# Patient Record
Sex: FEMALE | Race: WHITE | NOT HISPANIC OR LATINO | Employment: OTHER | URBAN - METROPOLITAN AREA
[De-identification: names, ages, dates, MRNs, and addresses within clinical notes are randomized per-mention and may not be internally consistent; named-entity substitution may affect disease eponyms.]

---

## 2023-10-19 PROBLEM — S52.121A RIGHT RADIAL HEAD FRACTURE: Status: ACTIVE | Noted: 2023-10-19

## 2023-10-19 PROBLEM — J02.9 SORE THROAT: Status: ACTIVE | Noted: 2023-10-19

## 2023-10-19 PROBLEM — R10.2 PELVIC PAIN IN FEMALE: Status: ACTIVE | Noted: 2023-10-19

## 2023-10-19 PROBLEM — R14.0 ABDOMINAL BLOATING: Status: ACTIVE | Noted: 2023-10-19

## 2023-10-19 PROBLEM — K76.0 FATTY LIVER: Status: ACTIVE | Noted: 2023-10-19

## 2023-10-19 PROBLEM — S50.00XA ELBOW CONTUSION: Status: ACTIVE | Noted: 2023-10-19

## 2023-10-19 PROBLEM — M17.11 ARTHRITIS OF KNEE, RIGHT: Status: ACTIVE | Noted: 2023-10-19

## 2023-10-19 PROBLEM — L82.1 OTHER SEBORRHEIC KERATOSIS: Status: ACTIVE | Noted: 2020-04-06

## 2023-10-19 PROBLEM — M75.101 ROTATOR CUFF TEAR, RIGHT: Status: ACTIVE | Noted: 2023-10-19

## 2023-10-19 PROBLEM — D18.01 HEMANGIOMA OF SKIN AND SUBCUTANEOUS TISSUE: Status: ACTIVE | Noted: 2020-04-06

## 2023-10-19 PROBLEM — R49.0 MUSCLE TENSION DYSPHONIA: Status: ACTIVE | Noted: 2023-10-19

## 2023-10-19 PROBLEM — K57.32 DIVERTICULITIS, COLON: Status: ACTIVE | Noted: 2023-10-19

## 2023-10-19 PROBLEM — S52.123D CLOSED DISPLACED FRACTURE OF HEAD OF RADIUS WITH ROUTINE HEALING: Status: ACTIVE | Noted: 2023-10-19

## 2023-10-19 PROBLEM — R51.9 PERSISTENT HEADACHES: Status: ACTIVE | Noted: 2023-10-19

## 2023-10-19 PROBLEM — M76.71 PERONEAL TENDINITIS OF BOTH LOWER LEGS: Status: ACTIVE | Noted: 2023-10-19

## 2023-10-19 PROBLEM — E66.9 CLASS 1 OBESITY WITHOUT SERIOUS COMORBIDITY WITH BODY MASS INDEX (BMI) OF 30.0 TO 30.9 IN ADULT: Status: ACTIVE | Noted: 2023-10-19

## 2023-10-19 PROBLEM — R93.89 ABNORMAL CHEST X-RAY: Status: ACTIVE | Noted: 2023-10-19

## 2023-10-19 PROBLEM — R06.83 SNORING: Status: ACTIVE | Noted: 2023-10-19

## 2023-10-19 PROBLEM — K90.49 FOOD INTOLERANCE: Status: ACTIVE | Noted: 2023-10-19

## 2023-10-19 PROBLEM — G56.21 CUBITAL TUNNEL SYNDROME ON RIGHT: Status: ACTIVE | Noted: 2023-10-19

## 2023-10-19 PROBLEM — N20.0 MULTIPLE KIDNEY STONES: Status: ACTIVE | Noted: 2023-10-19

## 2023-10-19 PROBLEM — J30.2 SEASONAL ALLERGIC RHINITIS: Status: ACTIVE | Noted: 2023-10-19

## 2023-10-19 PROBLEM — L72.9 CYST OF SKIN: Status: ACTIVE | Noted: 2023-10-19

## 2023-10-19 PROBLEM — R16.2 HEPATOSPLENOMEGALY: Status: ACTIVE | Noted: 2023-10-19

## 2023-10-19 PROBLEM — N83.8 CALCIFICATION OF OVARY: Status: ACTIVE | Noted: 2023-10-19

## 2023-10-19 PROBLEM — M79.603 CHRONIC ARM PAIN: Status: ACTIVE | Noted: 2023-10-19

## 2023-10-19 PROBLEM — E03.9 HYPOTHYROIDISM: Status: ACTIVE | Noted: 2023-10-19

## 2023-10-19 PROBLEM — G47.33 MILD OBSTRUCTIVE SLEEP APNEA: Status: ACTIVE | Noted: 2023-10-19

## 2023-10-19 PROBLEM — M22.40 CHONDROMALACIA OF PATELLOFEMORAL JOINT: Status: ACTIVE | Noted: 2023-10-19

## 2023-10-19 PROBLEM — R05.9 COUGH IN ADULT: Status: ACTIVE | Noted: 2023-10-19

## 2023-10-19 PROBLEM — J30.9 ALLERGIC RHINITIS: Status: ACTIVE | Noted: 2023-10-19

## 2023-10-19 PROBLEM — K21.9 REFLUX LARYNGITIS: Status: ACTIVE | Noted: 2023-10-19

## 2023-10-19 PROBLEM — M54.50 CHRONIC LOW BACK PAIN: Status: ACTIVE | Noted: 2023-10-19

## 2023-10-19 PROBLEM — T78.1XXA REACTION TO FOOD: Status: ACTIVE | Noted: 2023-10-19

## 2023-10-19 PROBLEM — L65.9 ALOPECIA: Status: ACTIVE | Noted: 2023-10-19

## 2023-10-19 PROBLEM — K21.9 GERD (GASTROESOPHAGEAL REFLUX DISEASE): Status: ACTIVE | Noted: 2023-10-19

## 2023-10-19 PROBLEM — G89.29 CHRONIC PAIN: Status: ACTIVE | Noted: 2023-10-19

## 2023-10-19 PROBLEM — G89.29 CHRONIC LOW BACK PAIN: Status: ACTIVE | Noted: 2023-10-19

## 2023-10-19 PROBLEM — L57.9 SKIN CHANGES DUE TO CHRONIC EXPOSURE TO NONIONIZING RADIATION, UNSPECIFIED: Status: ACTIVE | Noted: 2020-04-06

## 2023-10-19 PROBLEM — R10.9 ABDOMINAL PAIN: Status: ACTIVE | Noted: 2023-10-19

## 2023-10-19 PROBLEM — L73.8 OTHER SPECIFIED FOLLICULAR DISORDERS: Status: ACTIVE | Noted: 2020-04-06

## 2023-10-19 PROBLEM — J06.9 URI WITH COUGH AND CONGESTION: Status: ACTIVE | Noted: 2023-10-19

## 2023-10-19 PROBLEM — E78.5 HYPERLIPIDEMIA: Status: ACTIVE | Noted: 2023-10-19

## 2023-10-19 PROBLEM — E80.6 HYPERBILIRUBINEMIA: Status: ACTIVE | Noted: 2023-10-19

## 2023-10-19 PROBLEM — B00.9 HERPESVIRAL INFECTION, UNSPECIFIED: Status: ACTIVE | Noted: 2020-04-06

## 2023-10-19 PROBLEM — R19.7 DIARRHEA: Status: ACTIVE | Noted: 2023-10-19

## 2023-10-19 PROBLEM — R73.9 HYPERGLYCEMIA: Status: ACTIVE | Noted: 2023-10-19

## 2023-10-19 PROBLEM — J04.0 REFLUX LARYNGITIS: Status: ACTIVE | Noted: 2023-10-19

## 2023-10-19 PROBLEM — R19.00 MASS OF SOFT TISSUE OF ABDOMEN: Status: ACTIVE | Noted: 2023-10-19

## 2023-10-19 PROBLEM — E66.811 CLASS 1 OBESITY WITHOUT SERIOUS COMORBIDITY WITH BODY MASS INDEX (BMI) OF 30.0 TO 30.9 IN ADULT: Status: ACTIVE | Noted: 2023-10-19

## 2023-10-19 PROBLEM — G89.29 CHRONIC ARM PAIN: Status: ACTIVE | Noted: 2023-10-19
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
## 2023-10-19 PROBLEM — M76.72 PERONEAL TENDINITIS OF BOTH LOWER LEGS: Status: ACTIVE | Noted: 2023-10-19

## 2023-10-19 PROBLEM — L81.4 OTHER MELANIN HYPERPIGMENTATION: Status: ACTIVE | Noted: 2020-04-06

## 2023-10-19 PROBLEM — K52.9 ENTERITIS: Status: ACTIVE | Noted: 2023-10-19

## 2023-10-19 RX ORDER — OMEPRAZOLE 20 MG/1
CAPSULE, DELAYED RELEASE ORAL
COMMUNITY
End: 2023-10-20 | Stop reason: ALTCHOICE

## 2023-10-19 RX ORDER — GLUCOSAM/CHONDRO/HERB 149/HYAL 750-100 MG
TABLET ORAL
COMMUNITY

## 2023-10-19 RX ORDER — HYDROCORTISONE 25 MG/G
OINTMENT TOPICAL
COMMUNITY
Start: 2019-12-31 | End: 2023-10-20 | Stop reason: ALTCHOICE

## 2023-10-19 RX ORDER — OMEPRAZOLE 40 MG/1
CAPSULE, DELAYED RELEASE ORAL
COMMUNITY
Start: 2022-07-07 | End: 2023-10-20 | Stop reason: ALTCHOICE

## 2023-10-19 RX ORDER — GABAPENTIN 300 MG/1
CAPSULE ORAL
COMMUNITY
End: 2023-10-20 | Stop reason: ALTCHOICE

## 2023-10-19 RX ORDER — DIPHENHYDRAMINE HCL 25 MG
TABLET ORAL
COMMUNITY

## 2023-10-19 RX ORDER — LEVOTHYROXINE SODIUM 75 UG/1
1 TABLET ORAL DAILY
COMMUNITY
Start: 2014-10-30 | End: 2023-10-20 | Stop reason: ALTCHOICE

## 2023-10-19 RX ORDER — CHOLECALCIFEROL (VITAMIN D3) 50 MCG
1 TABLET ORAL DAILY
COMMUNITY

## 2023-10-19 RX ORDER — MECLIZINE HYDROCHLORIDE 25 MG/1
TABLET ORAL
COMMUNITY
End: 2023-10-20

## 2023-10-19 RX ORDER — BIOTIN 1 MG
TABLET ORAL
COMMUNITY

## 2023-10-19 RX ORDER — EPINEPHRINE 0.3 MG/.3ML
INJECTION, SOLUTION INTRAMUSCULAR
COMMUNITY
Start: 2021-12-23

## 2023-10-19 RX ORDER — BENZONATATE 200 MG/1
CAPSULE ORAL
COMMUNITY
End: 2023-10-20 | Stop reason: ALTCHOICE

## 2023-10-20 ENCOUNTER — OFFICE VISIT (OUTPATIENT)
Dept: PRIMARY CARE | Facility: CLINIC | Age: 68
End: 2023-10-20
Payer: MEDICARE

## 2023-10-20 VITALS
HEIGHT: 58 IN | WEIGHT: 167 LBS | BODY MASS INDEX: 35.05 KG/M2 | TEMPERATURE: 97.3 F | DIASTOLIC BLOOD PRESSURE: 78 MMHG | HEART RATE: 86 BPM | SYSTOLIC BLOOD PRESSURE: 119 MMHG

## 2023-10-20 DIAGNOSIS — E03.8 OTHER SPECIFIED HYPOTHYROIDISM: ICD-10-CM

## 2023-10-20 DIAGNOSIS — Z13.820 ENCOUNTER FOR OSTEOPOROSIS SCREENING IN ASYMPTOMATIC POSTMENOPAUSAL PATIENT: ICD-10-CM

## 2023-10-20 DIAGNOSIS — Z71.85 VACCINE COUNSELING: ICD-10-CM

## 2023-10-20 DIAGNOSIS — Z13.820 OSTEOPOROSIS SCREENING: ICD-10-CM

## 2023-10-20 DIAGNOSIS — Z12.11 SCREEN FOR COLON CANCER: ICD-10-CM

## 2023-10-20 DIAGNOSIS — Z78.0 ENCOUNTER FOR OSTEOPOROSIS SCREENING IN ASYMPTOMATIC POSTMENOPAUSAL PATIENT: ICD-10-CM

## 2023-10-20 DIAGNOSIS — Z00.00 MEDICARE WELCOME EXAM: ICD-10-CM

## 2023-10-20 DIAGNOSIS — Z13.6 SCREENING FOR HEART DISEASE: ICD-10-CM

## 2023-10-20 DIAGNOSIS — Z13.31 DEPRESSION SCREENING: ICD-10-CM

## 2023-10-20 DIAGNOSIS — I83.93 VARICOSE VEINS OF BOTH LOWER EXTREMITIES, UNSPECIFIED WHETHER COMPLICATED: ICD-10-CM

## 2023-10-20 DIAGNOSIS — R79.9 ABNORMAL FINDING OF BLOOD CHEMISTRY, UNSPECIFIED: ICD-10-CM

## 2023-10-20 DIAGNOSIS — Z23 FLU VACCINE NEED: ICD-10-CM

## 2023-10-20 DIAGNOSIS — Z12.31 ENCOUNTER FOR SCREENING MAMMOGRAM FOR MALIGNANT NEOPLASM OF BREAST: ICD-10-CM

## 2023-10-20 DIAGNOSIS — R10.13 EPIGASTRIC PAIN: ICD-10-CM

## 2023-10-20 DIAGNOSIS — Z78.9 NEVER SMOKED TOBACCO: ICD-10-CM

## 2023-10-20 DIAGNOSIS — E78.49 OTHER HYPERLIPIDEMIA: ICD-10-CM

## 2023-10-20 DIAGNOSIS — Z13.89 SCREENING FOR OBESITY: Primary | ICD-10-CM

## 2023-10-20 DIAGNOSIS — L65.9 HAIR LOSS: ICD-10-CM

## 2023-10-20 PROCEDURE — G0438 PPPS, INITIAL VISIT: HCPCS | Performed by: INTERNAL MEDICINE

## 2023-10-20 PROCEDURE — 1160F RVW MEDS BY RX/DR IN RCRD: CPT | Performed by: INTERNAL MEDICINE

## 2023-10-20 PROCEDURE — G0008 ADMIN INFLUENZA VIRUS VAC: HCPCS | Performed by: INTERNAL MEDICINE

## 2023-10-20 PROCEDURE — 90662 IIV NO PRSV INCREASED AG IM: CPT | Performed by: INTERNAL MEDICINE

## 2023-10-20 PROCEDURE — 1036F TOBACCO NON-USER: CPT | Performed by: INTERNAL MEDICINE

## 2023-10-20 PROCEDURE — 1125F AMNT PAIN NOTED PAIN PRSNT: CPT | Performed by: INTERNAL MEDICINE

## 2023-10-20 PROCEDURE — 1159F MED LIST DOCD IN RCRD: CPT | Performed by: INTERNAL MEDICINE

## 2023-10-20 PROCEDURE — 3008F BODY MASS INDEX DOCD: CPT | Performed by: INTERNAL MEDICINE

## 2023-10-20 RX ORDER — ALBUTEROL SULFATE 90 UG/1
2 AEROSOL, METERED RESPIRATORY (INHALATION) EVERY 4 HOURS PRN
COMMUNITY
Start: 2019-02-08 | End: 2023-10-20 | Stop reason: ALTCHOICE

## 2023-10-20 RX ORDER — FLUCONAZOLE 100 MG/1
TABLET ORAL
COMMUNITY
Start: 2023-09-21 | End: 2023-10-20 | Stop reason: ALTCHOICE

## 2023-10-20 RX ORDER — AMOXICILLIN 500 MG/1
CAPSULE ORAL
COMMUNITY
Start: 2023-08-29 | End: 2023-10-20 | Stop reason: ALTCHOICE

## 2023-10-20 RX ORDER — VALACYCLOVIR HYDROCHLORIDE 500 MG/1
TABLET, FILM COATED ORAL
COMMUNITY
Start: 2023-08-29 | End: 2023-10-20 | Stop reason: ALTCHOICE

## 2023-10-20 ASSESSMENT — PATIENT HEALTH QUESTIONNAIRE - PHQ9
SUM OF ALL RESPONSES TO PHQ QUESTIONS 1-9: 6
3. TROUBLE FALLING OR STAYING ASLEEP: SEVERAL DAYS
2. FEELING DOWN, DEPRESSED OR HOPELESS: NEARLY EVERY DAY
7. TROUBLE CONCENTRATING ON THINGS, SUCH AS READING THE NEWSPAPER OR WATCHING TELEVISION: NOT AT ALL
6. FEELING BAD ABOUT YOURSELF - OR THAT YOU ARE A FAILURE OR HAVE LET YOURSELF OR YOUR FAMILY DOWN: NOT AT ALL
10. IF YOU CHECKED OFF ANY PROBLEMS, HOW DIFFICULT HAVE THESE PROBLEMS MADE IT FOR YOU TO DO YOUR WORK, TAKE CARE OF THINGS AT HOME, OR GET ALONG WITH OTHER PEOPLE: SOMEWHAT DIFFICULT
1. LITTLE INTEREST OR PLEASURE IN DOING THINGS: NOT AT ALL
SUM OF ALL RESPONSES TO PHQ9 QUESTIONS 1 & 2: 3
9. THOUGHTS THAT YOU WOULD BE BETTER OFF DEAD, OR OF HURTING YOURSELF: NOT AT ALL
8. MOVING OR SPEAKING SO SLOWLY THAT OTHER PEOPLE COULD HAVE NOTICED. OR THE OPPOSITE, BEING SO FIGETY OR RESTLESS THAT YOU HAVE BEEN MOVING AROUND A LOT MORE THAN USUAL: NOT AT ALL
4. FEELING TIRED OR HAVING LITTLE ENERGY: SEVERAL DAYS
5. POOR APPETITE OR OVEREATING: SEVERAL DAYS

## 2023-10-20 ASSESSMENT — ENCOUNTER SYMPTOMS
LOSS OF SENSATION IN FEET: 0
DEPRESSION: 0
OCCASIONAL FEELINGS OF UNSTEADINESS: 0

## 2023-10-20 NOTE — PROGRESS NOTES
"Subjective   Reason for Visit: Cristy Morgna is an 67 y.o. female here for a Medicare Wellness visit.          Reviewed all medications by prescribing practitioner or clinical pharmacist (such as prescriptions, OTCs, herbal therapies and supplements) and documented in the medical record.    HPI Pt is a pleasant 67 y.o. F who was seen and evaluated during the Initial Medicare Visit. Pt was not seen by PCP for the couple years and was off the Levothyroxine at least a year. Pt also states that she had a \"bad food poisoning\" during her international trip to [place]. Pt was at the hospital at that time and was told that she has \"enlarged liver\". Pt states that she has the chronic epigastric tenderness, triggered by the physical activity. Pt also reports weigh skye and hair loss issue. During the clinical encounter pt denies fever, chills, no SOB, no chest pain/tightness, no N/V/D reported, no change of urination reported. Pt denies any other health concerns during this visit. Pt would like to have all screening appropriate for her age group.  The list of the medications reviewed  Sohx: reviewed  PMHx and Fhx: reviewed      Patient Care Team:  Vidhya Leavitt DO as PCP - General     Review of Systems  Constitutional: no fever, no chills  Eyes: no eyesight problems, no eye redness, no eye pain, no blurred vision  ENT: no ear pain or sore throat, no nasal discharge or congestion, no sinus pressure, no hearing loss  Cardiovascular: no chest pain or tightness, no SOB, the heart rate was not fast or slow  Respiratory: no cough, no dyspnea with exertion or with rest, no wheezing  Gastrointestinal: no constipation or diarrhea, no heartburn, no nausea or vomiting, but as mentioned at [facility]  Genitourinary: no urine frequency, no dysuria, no urinary urgency, no urinary incontinence or retention  Musculoskeletal: no back pain, no arthralgia, no joint swelling or stiffness, no muscle weakness  Integumentary: no skin lesions or rash, " "but as mentioned  Neurological: no headaches, no dizziness or fainting, no limb weakness  Psychiatric: no mood changes, no anxiety or depression, no suicidal thoughts  Endocrine: unable to loose weight, no temperature intolerance, no change of appetite  Hematologic/Lymphatic: no easy bruising or bleeding    Objective   Vitals:  /78 (BP Location: Left arm)   Pulse 86   Temp 36.3 °C (97.3 °F)   Ht 1.473 m (4' 10\")   Wt 75.8 kg (167 lb)   BMI 34.90 kg/m²       Physical Exam    Constitutional: well hydrated, well nourished, no acute distress. Vital signs reviewed  Head and face: normocephalic, atraumatic  Eyes: no erythema, edema or visible abnormalities of conjunctiva or lids. Pupils are equal, round and reactive to light. Extraocular movement normal  ENT: external ears without deformities, external ear canals without redness, swelling or tenderness. TMs normal color, normal landmarks, no fluid, non-retracted  Neck: full ROM, no adenopathy, neck was supple, thyroid gland not enlarged, no palpable nodules  Pulmonary: normal respiratory rate and effort. Lungs are clear to auscultation, no rales,no rhonchi or wheezing  Cardiovascular: regular rate and rhythm, normal S1 and S2, no murmur, no gallop, posterior tib. pulse normal 2+ bilaterally, no lower extremity edema  Abdomen: soft,  dis comfortable  on palpation at the epigastric region, not distended, normal BS in all 4 quadrants, no CVA tenderness  Musculoskeletal: no joint swelling, normal movements of all 4 extremities. Gait and station: normal, Digits and nails: normal without clubbing  Skin: normal color, no rash  Neurologic: Cranial nerves: CN II: visual acuity intact. Source: acuity reported by patient. Pupils reactive to light with normal accommodation. Right and left pupil normal CN III, IV and VI: the EO movements were intact. CN VIII: no hearing loss. Deep tendon reflexes: were 2+ and symmetric: R and L patella.  Sensory exam: normal light to " touch  Psychiatric: Mood and affect: normal, Alert and Oriented x 3  Lymphatic: no cervical lymphadenopathy    Assessment/Plan   Initial medicare wellness visit:  Hx and PE as mentioned  Obesity screening: BMI of 34, pt will be beneficial from the lifestyle modifications  MDD screening: declined by the pt  Breast CA screening: the screening mammogram was ordered  Osteoporosis screening: will order DEXA scan  Pt is UTD with the screening colonoscopy and cervical CA screening  Pt was counseled about vaccine and flu vaccine was provided today  Pt will need AWV    Hypothyroidism: will check TSH/T4  HLD: will check lipid panel  Will order DM screening with HGBA1C  Epigastric discomfort: will order US of the liver  Hair loss issue: will provide a referral for the dermatology team evaluation as pre the pts request  Plan was d/c with the pt in details, verbalized understanding, agreed  Please follow up with PCP as planned or sooner if needed             Problem List Items Addressed This Visit    None

## 2023-10-24 ENCOUNTER — LAB (OUTPATIENT)
Dept: LAB | Facility: LAB | Age: 68
End: 2023-10-24
Payer: MEDICARE

## 2023-10-24 DIAGNOSIS — Z00.00 MEDICARE WELCOME EXAM: ICD-10-CM

## 2023-10-24 DIAGNOSIS — R79.9 ABNORMAL FINDING OF BLOOD CHEMISTRY, UNSPECIFIED: ICD-10-CM

## 2023-10-24 DIAGNOSIS — E78.49 OTHER HYPERLIPIDEMIA: ICD-10-CM

## 2023-10-24 DIAGNOSIS — E03.8 OTHER SPECIFIED HYPOTHYROIDISM: ICD-10-CM

## 2023-10-24 LAB
ALBUMIN SERPL BCP-MCNC: 4.2 G/DL (ref 3.4–5)
ALP SERPL-CCNC: 53 U/L (ref 33–136)
ALT SERPL W P-5'-P-CCNC: 34 U/L (ref 7–45)
ANION GAP SERPL CALC-SCNC: 12 MMOL/L (ref 10–20)
AST SERPL W P-5'-P-CCNC: 31 U/L (ref 9–39)
BASOPHILS # BLD AUTO: 0.05 X10*3/UL (ref 0–0.1)
BASOPHILS NFR BLD AUTO: 1 %
BILIRUB SERPL-MCNC: 1.2 MG/DL (ref 0–1.2)
BUN SERPL-MCNC: 9 MG/DL (ref 6–23)
CALCIUM SERPL-MCNC: 9.1 MG/DL (ref 8.6–10.3)
CHLORIDE SERPL-SCNC: 105 MMOL/L (ref 98–107)
CHOLEST SERPL-MCNC: 205 MG/DL (ref 0–199)
CHOLESTEROL/HDL RATIO: 4.7
CO2 SERPL-SCNC: 28 MMOL/L (ref 21–32)
CREAT SERPL-MCNC: 0.77 MG/DL (ref 0.5–1.05)
EOSINOPHIL # BLD AUTO: 0.14 X10*3/UL (ref 0–0.7)
EOSINOPHIL NFR BLD AUTO: 2.7 %
ERYTHROCYTE [DISTWIDTH] IN BLOOD BY AUTOMATED COUNT: 12.5 % (ref 11.5–14.5)
EST. AVERAGE GLUCOSE BLD GHB EST-MCNC: 117 MG/DL
GFR SERPL CREATININE-BSD FRML MDRD: 86 ML/MIN/1.73M*2
GLUCOSE SERPL-MCNC: 105 MG/DL (ref 74–99)
HBA1C MFR BLD: 5.7 %
HCT VFR BLD AUTO: 44.2 % (ref 36–46)
HDLC SERPL-MCNC: 44 MG/DL
HGB BLD-MCNC: 14.9 G/DL (ref 12–16)
IMM GRANULOCYTES # BLD AUTO: 0.05 X10*3/UL (ref 0–0.7)
IMM GRANULOCYTES NFR BLD AUTO: 1 % (ref 0–0.9)
LDLC SERPL CALC-MCNC: 112 MG/DL
LYMPHOCYTES # BLD AUTO: 2.33 X10*3/UL (ref 1.2–4.8)
LYMPHOCYTES NFR BLD AUTO: 44.6 %
MCH RBC QN AUTO: 32 PG (ref 26–34)
MCHC RBC AUTO-ENTMCNC: 33.7 G/DL (ref 32–36)
MCV RBC AUTO: 95 FL (ref 80–100)
MONOCYTES # BLD AUTO: 0.44 X10*3/UL (ref 0.1–1)
MONOCYTES NFR BLD AUTO: 8.4 %
NEUTROPHILS # BLD AUTO: 2.22 X10*3/UL (ref 1.2–7.7)
NEUTROPHILS NFR BLD AUTO: 42.3 %
NON HDL CHOLESTEROL: 161 MG/DL (ref 0–149)
NRBC BLD-RTO: 0 /100 WBCS (ref 0–0)
PLATELET # BLD AUTO: 144 X10*3/UL (ref 150–450)
PMV BLD AUTO: 10.9 FL (ref 7.5–11.5)
POTASSIUM SERPL-SCNC: 3.7 MMOL/L (ref 3.5–5.3)
PROT SERPL-MCNC: 6.9 G/DL (ref 6.4–8.2)
RBC # BLD AUTO: 4.66 X10*6/UL (ref 4–5.2)
SODIUM SERPL-SCNC: 141 MMOL/L (ref 136–145)
T4 FREE SERPL-MCNC: 0.52 NG/DL (ref 0.61–1.12)
TRIGL SERPL-MCNC: 247 MG/DL (ref 0–149)
TSH SERPL-ACNC: 10.37 MIU/L (ref 0.44–3.98)
VLDL: 49 MG/DL (ref 0–40)
WBC # BLD AUTO: 5.2 X10*3/UL (ref 4.4–11.3)

## 2023-10-24 PROCEDURE — 85025 COMPLETE CBC W/AUTO DIFF WBC: CPT

## 2023-10-24 PROCEDURE — 80053 COMPREHEN METABOLIC PANEL: CPT

## 2023-10-24 PROCEDURE — 84443 ASSAY THYROID STIM HORMONE: CPT

## 2023-10-24 PROCEDURE — 80061 LIPID PANEL: CPT

## 2023-10-24 PROCEDURE — 83036 HEMOGLOBIN GLYCOSYLATED A1C: CPT

## 2023-10-24 PROCEDURE — 36415 COLL VENOUS BLD VENIPUNCTURE: CPT

## 2023-10-24 PROCEDURE — 84439 ASSAY OF FREE THYROXINE: CPT

## 2023-10-26 ENCOUNTER — TELEPHONE (OUTPATIENT)
Dept: PRIMARY CARE | Facility: CLINIC | Age: 68
End: 2023-10-26
Payer: MEDICARE

## 2023-10-27 ENCOUNTER — APPOINTMENT (OUTPATIENT)
Dept: PRIMARY CARE | Facility: CLINIC | Age: 68
End: 2023-10-27
Payer: MEDICARE

## 2023-10-28 ENCOUNTER — ANCILLARY PROCEDURE (OUTPATIENT)
Dept: RADIOLOGY | Facility: CLINIC | Age: 68
End: 2023-10-28
Payer: MEDICARE

## 2023-10-28 DIAGNOSIS — Z00.00 MEDICARE WELCOME EXAM: ICD-10-CM

## 2023-10-28 DIAGNOSIS — Z13.820 OSTEOPOROSIS SCREENING: ICD-10-CM

## 2023-10-28 DIAGNOSIS — Z13.820 ENCOUNTER FOR OSTEOPOROSIS SCREENING IN ASYMPTOMATIC POSTMENOPAUSAL PATIENT: ICD-10-CM

## 2023-10-28 DIAGNOSIS — R10.13 EPIGASTRIC PAIN: ICD-10-CM

## 2023-10-28 DIAGNOSIS — Z78.0 ENCOUNTER FOR OSTEOPOROSIS SCREENING IN ASYMPTOMATIC POSTMENOPAUSAL PATIENT: ICD-10-CM

## 2023-10-28 PROCEDURE — 76705 ECHO EXAM OF ABDOMEN: CPT

## 2023-10-28 PROCEDURE — 76705 ECHO EXAM OF ABDOMEN: CPT | Performed by: RADIOLOGY

## 2023-10-30 ENCOUNTER — OFFICE VISIT (OUTPATIENT)
Dept: PRIMARY CARE | Facility: CLINIC | Age: 68
End: 2023-10-30
Payer: MEDICARE

## 2023-10-30 ENCOUNTER — APPOINTMENT (OUTPATIENT)
Dept: RADIOLOGY | Facility: CLINIC | Age: 68
End: 2023-10-30
Payer: MEDICARE

## 2023-10-30 VITALS
BODY MASS INDEX: 34.63 KG/M2 | HEART RATE: 79 BPM | HEIGHT: 58 IN | DIASTOLIC BLOOD PRESSURE: 74 MMHG | WEIGHT: 165 LBS | SYSTOLIC BLOOD PRESSURE: 119 MMHG | TEMPERATURE: 97.2 F

## 2023-10-30 DIAGNOSIS — R73.03 PRE-DIABETES: ICD-10-CM

## 2023-10-30 DIAGNOSIS — Z71.2 ENCOUNTER TO DISCUSS TEST RESULTS: ICD-10-CM

## 2023-10-30 DIAGNOSIS — Z71.89 CARE PLAN DISCUSSED WITH PATIENT: Primary | ICD-10-CM

## 2023-10-30 DIAGNOSIS — E78.49 OTHER HYPERLIPIDEMIA: ICD-10-CM

## 2023-10-30 DIAGNOSIS — E03.8 OTHER SPECIFIED HYPOTHYROIDISM: ICD-10-CM

## 2023-10-30 DIAGNOSIS — K76.0 FATTY LIVER: ICD-10-CM

## 2023-10-30 DIAGNOSIS — D69.6 DECREASED PLATELET COUNT (CMS-HCC): ICD-10-CM

## 2023-10-30 PROCEDURE — 1036F TOBACCO NON-USER: CPT | Performed by: INTERNAL MEDICINE

## 2023-10-30 PROCEDURE — 1160F RVW MEDS BY RX/DR IN RCRD: CPT | Performed by: INTERNAL MEDICINE

## 2023-10-30 PROCEDURE — 3008F BODY MASS INDEX DOCD: CPT | Performed by: INTERNAL MEDICINE

## 2023-10-30 PROCEDURE — 1159F MED LIST DOCD IN RCRD: CPT | Performed by: INTERNAL MEDICINE

## 2023-10-30 PROCEDURE — 99214 OFFICE O/P EST MOD 30 MIN: CPT | Performed by: INTERNAL MEDICINE

## 2023-10-30 PROCEDURE — 1125F AMNT PAIN NOTED PAIN PRSNT: CPT | Performed by: INTERNAL MEDICINE

## 2023-10-30 RX ORDER — LEVOTHYROXINE SODIUM 25 UG/1
25 TABLET ORAL
Qty: 30 TABLET | Refills: 11 | Status: SHIPPED | OUTPATIENT
Start: 2023-10-30 | End: 2024-01-15

## 2023-10-30 RX ORDER — SIMVASTATIN 10 MG/1
10 TABLET, FILM COATED ORAL NIGHTLY
Qty: 30 TABLET | Refills: 5 | Status: SHIPPED | OUTPATIENT
Start: 2023-10-30 | End: 2024-01-15

## 2023-10-30 ASSESSMENT — PATIENT HEALTH QUESTIONNAIRE - PHQ9
1. LITTLE INTEREST OR PLEASURE IN DOING THINGS: NOT AT ALL
SUM OF ALL RESPONSES TO PHQ9 QUESTIONS 1 AND 2: 0
2. FEELING DOWN, DEPRESSED OR HOPELESS: NOT AT ALL

## 2023-10-30 NOTE — PROGRESS NOTES
"Subjective   Patient ID: Cristy Morgan is a 68 y.o. female who presents for Follow-up (Blood work results.).    HPI Pt is a pleasant 68 y.o. CF who was seen and evaluated during the follow up OV to discuss the recent tests results. During the clinical encounter pt denies fever, chills, no SOB, no chest pain/tightness, no abdominal pain, no N/V/D reported, no change of urination reported. Pt denies any other health concerns during this visit.  Sohx: reviewed  PMHx and Fhx: reviewed    Review of Systems  Constitutional: no fever, no chills  Eyes: no eyesight problems, no eye redness, no eye pain, no blurred vision  ENT: no ear pain or sore throat, no nasal discharge or congestion, no sinus pressure, no hearing loss  Cardiovascular: no chest pain or tightness, no SOB, the heart rate was not fast or slow  Respiratory: no cough, no dyspnea with exertion or with rest, no wheezing  Gastrointestinal: no abdominal pain, no constipation or diarrhea, no heartburn, no nausea or vomiting  Genitourinary: no urine frequency, no dysuria, no urinary urgency, no urinary incontinence or retention  Musculoskeletal: no back pain, no arthralgia, no joint swelling or stiffness, no muscle weakness  Integumentary: no skin lesions or rash  Neurological: no headaches, no dizziness or fainting, no limb weakness  Psychiatric: no mood changes, no anxiety or depression, no suicidal thoughts  Endocrine: no weight change, no temperature intolerance, no change of appetite  Hematologic/Lymphatic: no easy bruising or bleeding    Objective   /74 (BP Location: Right arm, Patient Position: Sitting)   Pulse 79   Temp 36.2 °C (97.2 °F)   Ht 1.473 m (4' 10\")   Wt 74.8 kg (165 lb)   BMI 34.49 kg/m²     Physical Exam  Constitutional: well hydrated, well nourished, no acute distress. Vital signs reviewed  Head and face: normocephalic, atraumatic  Eyes: no erythema, edema or visible abnormalities of conjunctiva or lids. Pupils are equal, round " and reactive to light. Extraocular movement normal  ENT: external ears without deformities  Neck: full ROM  Pulmonary: normal respiratory rate and effort. Lungs are clear to auscultation, no rales,no rhonchi or wheezing  Cardiovascular: regular rate and rhythm, normal S1 and S2, no murmur, no gallop, posterior tib. pulse normal 2+ bilaterally, no lower extremity edema  Abdomen: soft, non tender on palpation, not distended, normal BS in all 4 quadrant  Musculoskeletal: no joint swelling, normal movements of all 4 extremities. Gait and station: normal, Digits and nails: normal without clubbing  Skin: normal color, no rash  Neurologic: Cranial nerves: CN II: visual acuity intact. Source: acuity reported by patient. Pupils reactive to light with normal accommodation. Right and left pupil normal CN III, IV and VI: the EO movements were intact. CN VIII: no hearing loss. Sensory exam: normal light to touch  Psychiatric: Mood and affect: normal, Alert and Oriented x 3  Lymphatic: no cervical lymphadenopathy      Assessment/Plan   The encounter to discuss the recent BW results:  I shared and discuss with the lt the recent BW results and US of the liver in details. Pt verbalized understanding  Hypothyroidism: Will start on Levothyroxine 25 mcg PO daily  Pre diabetes: Pt was referred for the dietician evaluation. Based on the hx of pre diabetes and Obesity WHO class I pt might be eligible for the pharmacological  weight loss treatment with weekly injections of Semaglutide.   HLD: will start on simvastatin 10 mg  Abnormal PLT count: Will repeat CBC  Fatty liver issue: pt will be beneficial from the lifestyle modifications  Will rechekc CBC, CMP, TSH/t4 and lipid panel in 2 months  Plan was d/c with the pt in details, verbalized understanding, agreed  Please follow up with PCP as planned or sooner if needed

## 2023-11-01 ENCOUNTER — TELEPHONE (OUTPATIENT)
Dept: NEPHROLOGY | Facility: CLINIC | Age: 68
End: 2023-11-01
Payer: MEDICARE

## 2023-11-01 ENCOUNTER — ANCILLARY PROCEDURE (OUTPATIENT)
Dept: RADIOLOGY | Facility: CLINIC | Age: 68
End: 2023-11-01
Payer: MEDICARE

## 2023-11-01 PROCEDURE — 77080 DXA BONE DENSITY AXIAL: CPT

## 2023-11-01 PROCEDURE — 77080 DXA BONE DENSITY AXIAL: CPT | Performed by: STUDENT IN AN ORGANIZED HEALTH CARE EDUCATION/TRAINING PROGRAM

## 2023-11-01 NOTE — TELEPHONE ENCOUNTER
----- Message from Evelin Ghotra MD sent at 11/1/2023 11:43 AM EDT -----  Please, inform the pt that the recent scan for osteoporosis showed osteopenia (Osteopenia is a condition that begins as you lose bone mass and your bones get weaker). Pt should follow with high calcium rich vitamin D diet and exercise regularly.  Thank you

## 2023-11-01 NOTE — PROGRESS NOTES
Please, inform the pt that the recent scan for osteoporosis showed osteopenia (Osteopenia is a condition that begins as you lose bone mass and your bones get weaker). Pt should follow with high calcium rich vitamin D diet and exercise regularly.   Thank you

## 2023-11-24 ENCOUNTER — LAB REQUISITION (OUTPATIENT)
Dept: LAB | Facility: HOSPITAL | Age: 68
End: 2023-11-24
Payer: MEDICARE

## 2023-11-24 DIAGNOSIS — N39.0 URINARY TRACT INFECTION, SITE NOT SPECIFIED: ICD-10-CM

## 2023-11-24 PROCEDURE — 87186 SC STD MICRODIL/AGAR DIL: CPT

## 2023-11-24 PROCEDURE — 87086 URINE CULTURE/COLONY COUNT: CPT

## 2023-11-27 LAB — BACTERIA UR CULT: ABNORMAL

## 2024-01-04 ENCOUNTER — LAB (OUTPATIENT)
Dept: LAB | Facility: LAB | Age: 69
End: 2024-01-04
Payer: MEDICARE

## 2024-01-04 DIAGNOSIS — K76.0 FATTY LIVER: ICD-10-CM

## 2024-01-04 DIAGNOSIS — R73.03 PRE-DIABETES: ICD-10-CM

## 2024-01-04 DIAGNOSIS — E78.49 OTHER HYPERLIPIDEMIA: ICD-10-CM

## 2024-01-04 DIAGNOSIS — E03.8 OTHER SPECIFIED HYPOTHYROIDISM: ICD-10-CM

## 2024-01-04 DIAGNOSIS — D69.6 DECREASED PLATELET COUNT (CMS-HCC): ICD-10-CM

## 2024-01-04 LAB
ALBUMIN SERPL BCP-MCNC: 4.5 G/DL (ref 3.4–5)
ALP SERPL-CCNC: 43 U/L (ref 33–136)
ALT SERPL W P-5'-P-CCNC: 26 U/L (ref 7–45)
ANION GAP SERPL CALC-SCNC: 16 MMOL/L (ref 10–20)
AST SERPL W P-5'-P-CCNC: 31 U/L (ref 9–39)
BASOPHILS # BLD AUTO: 0.03 X10*3/UL (ref 0–0.1)
BASOPHILS NFR BLD AUTO: 0.5 %
BILIRUB SERPL-MCNC: 1.8 MG/DL (ref 0–1.2)
BUN SERPL-MCNC: 15 MG/DL (ref 6–23)
CALCIUM SERPL-MCNC: 9.3 MG/DL (ref 8.6–10.3)
CHLORIDE SERPL-SCNC: 104 MMOL/L (ref 98–107)
CHOLEST SERPL-MCNC: 154 MG/DL (ref 0–199)
CHOLESTEROL/HDL RATIO: 2.9
CO2 SERPL-SCNC: 26 MMOL/L (ref 21–32)
CREAT SERPL-MCNC: 0.91 MG/DL (ref 0.5–1.05)
EOSINOPHIL # BLD AUTO: 0.13 X10*3/UL (ref 0–0.7)
EOSINOPHIL NFR BLD AUTO: 2.3 %
ERYTHROCYTE [DISTWIDTH] IN BLOOD BY AUTOMATED COUNT: 12.5 % (ref 11.5–14.5)
GFR SERPL CREATININE-BSD FRML MDRD: 70 ML/MIN/1.73M*2
GLUCOSE SERPL-MCNC: 84 MG/DL (ref 74–99)
HCT VFR BLD AUTO: 42.2 % (ref 36–46)
HDLC SERPL-MCNC: 52.8 MG/DL
HGB BLD-MCNC: 14.7 G/DL (ref 12–16)
IMM GRANULOCYTES # BLD AUTO: 0.01 X10*3/UL (ref 0–0.7)
IMM GRANULOCYTES NFR BLD AUTO: 0.2 % (ref 0–0.9)
LDLC SERPL CALC-MCNC: 77 MG/DL
LYMPHOCYTES # BLD AUTO: 2.32 X10*3/UL (ref 1.2–4.8)
LYMPHOCYTES NFR BLD AUTO: 40.4 %
MCH RBC QN AUTO: 32.2 PG (ref 26–34)
MCHC RBC AUTO-ENTMCNC: 34.8 G/DL (ref 32–36)
MCV RBC AUTO: 92 FL (ref 80–100)
MONOCYTES # BLD AUTO: 0.46 X10*3/UL (ref 0.1–1)
MONOCYTES NFR BLD AUTO: 8 %
NEUTROPHILS # BLD AUTO: 2.79 X10*3/UL (ref 1.2–7.7)
NEUTROPHILS NFR BLD AUTO: 48.6 %
NON HDL CHOLESTEROL: 101 MG/DL (ref 0–149)
NRBC BLD-RTO: 0 /100 WBCS (ref 0–0)
PLATELET # BLD AUTO: 146 X10*3/UL (ref 150–450)
POTASSIUM SERPL-SCNC: 3.6 MMOL/L (ref 3.5–5.3)
PROT SERPL-MCNC: 6.9 G/DL (ref 6.4–8.2)
RBC # BLD AUTO: 4.57 X10*6/UL (ref 4–5.2)
SODIUM SERPL-SCNC: 142 MMOL/L (ref 136–145)
T4 FREE SERPL-MCNC: 0.78 NG/DL (ref 0.61–1.12)
TRIGL SERPL-MCNC: 122 MG/DL (ref 0–149)
TSH SERPL-ACNC: 5.04 MIU/L (ref 0.44–3.98)
VLDL: 24 MG/DL (ref 0–40)
WBC # BLD AUTO: 5.7 X10*3/UL (ref 4.4–11.3)

## 2024-01-04 PROCEDURE — 85025 COMPLETE CBC W/AUTO DIFF WBC: CPT

## 2024-01-04 PROCEDURE — 84443 ASSAY THYROID STIM HORMONE: CPT

## 2024-01-04 PROCEDURE — 80053 COMPREHEN METABOLIC PANEL: CPT

## 2024-01-04 PROCEDURE — 36415 COLL VENOUS BLD VENIPUNCTURE: CPT

## 2024-01-04 PROCEDURE — 80061 LIPID PANEL: CPT

## 2024-01-04 PROCEDURE — 84439 ASSAY OF FREE THYROXINE: CPT

## 2024-01-05 ENCOUNTER — OFFICE VISIT (OUTPATIENT)
Dept: PRIMARY CARE | Facility: CLINIC | Age: 69
End: 2024-01-05
Payer: MEDICARE

## 2024-01-05 ENCOUNTER — TELEPHONE (OUTPATIENT)
Dept: PRIMARY CARE | Facility: CLINIC | Age: 69
End: 2024-01-05

## 2024-01-05 VITALS
WEIGHT: 162 LBS | SYSTOLIC BLOOD PRESSURE: 124 MMHG | BODY MASS INDEX: 34 KG/M2 | HEART RATE: 76 BPM | HEIGHT: 58 IN | DIASTOLIC BLOOD PRESSURE: 64 MMHG | TEMPERATURE: 97.4 F | OXYGEN SATURATION: 95 %

## 2024-01-05 DIAGNOSIS — F43.9 STRESS AT HOME: ICD-10-CM

## 2024-01-05 DIAGNOSIS — K76.0 FATTY LIVER: ICD-10-CM

## 2024-01-05 DIAGNOSIS — E03.8 OTHER SPECIFIED HYPOTHYROIDISM: ICD-10-CM

## 2024-01-05 DIAGNOSIS — Z76.89 ENCOUNTER FOR WEIGHT MANAGEMENT: ICD-10-CM

## 2024-01-05 DIAGNOSIS — D69.6 DECREASED PLATELET COUNT (CMS-HCC): ICD-10-CM

## 2024-01-05 DIAGNOSIS — E78.49 OTHER HYPERLIPIDEMIA: ICD-10-CM

## 2024-01-05 PROCEDURE — 1160F RVW MEDS BY RX/DR IN RCRD: CPT | Performed by: INTERNAL MEDICINE

## 2024-01-05 PROCEDURE — 99214 OFFICE O/P EST MOD 30 MIN: CPT | Performed by: INTERNAL MEDICINE

## 2024-01-05 PROCEDURE — 1125F AMNT PAIN NOTED PAIN PRSNT: CPT | Performed by: INTERNAL MEDICINE

## 2024-01-05 PROCEDURE — 3008F BODY MASS INDEX DOCD: CPT | Performed by: INTERNAL MEDICINE

## 2024-01-05 PROCEDURE — 1036F TOBACCO NON-USER: CPT | Performed by: INTERNAL MEDICINE

## 2024-01-05 PROCEDURE — 1159F MED LIST DOCD IN RCRD: CPT | Performed by: INTERNAL MEDICINE

## 2024-01-05 RX ORDER — ISOPROPYL ALCOHOL 70 ML/100ML
100 SWAB TOPICAL
Qty: 30 EACH | Refills: 0 | Status: SHIPPED | OUTPATIENT
Start: 2024-01-05 | End: 2024-02-04

## 2024-01-05 NOTE — TELEPHONE ENCOUNTER
----- Message from Evelin Ghotra MD sent at 1/5/2024  8:19 AM EST -----  Please, inform the pt that the recent BW showed minimally abnormal thyroid gland function. PLT still mildly decreased. Cholesterol level is back to normal.  Please, schedule a FU OV within 1-2 weeks  Thank you

## 2024-01-05 NOTE — PROGRESS NOTES
"Subjective   Patient ID: Cristy Morgan is a 68 y.o. female who presents for Results (Go on meds ).    HPI Pt is a pleasant 68 y.o. F who was seen and evaluated during the OV. Pt states that overall she feels fine. Pt denies any active health issue other than stress at home due to the family issues. Pt recently had the BW test and would like to discuss the results as well as the pharmacological weight loss Tx. Pt states that she was compliant with all her medications. During the clinical encounter pt denies fever, chills, no SOB, no chest pain/tightness, no abdominal pain, no N/V/D reported, no change of urination reported. Pt denies any other health concerns during this visit.  Sohx: reviewed  PMHx and Fhx: reviewed    Review of Systems  Constitutional: no fever, no chills  Eyes: no eyesight problems, no eye redness, no eye pain, no blurred vision  ENT: no ear pain or sore throat, no nasal discharge or congestion, no sinus pressure, no hearing loss  Cardiovascular: no chest pain or tightness, no SOB, the heart rate was not fast or slow  Respiratory: no cough, no dyspnea with exertion or with rest, no wheezing  Gastrointestinal: no abdominal pain, no constipation or diarrhea, no heartburn, no nausea or vomiting  Genitourinary: no urine frequency, no dysuria, no urinary urgency, no urinary incontinence or retention  Musculoskeletal: no back pain, no arthralgia, no joint swelling or stiffness, no muscle weakness  Integumentary: no skin lesions or rash  Neurological: no headaches, no dizziness or fainting, no limb weakness  Psychiatric: no anxiety or depression, no suicidal thoughts, but reports stress at home  Endocrine: no weight change, no temperature intolerance, no change of appetite  Hematologic/Lymphatic: no easy bruising or bleeding    Objective   /64   Pulse 76   Temp 36.3 °C (97.4 °F)   Ht 1.473 m (4' 10\")   Wt 73.5 kg (162 lb)   SpO2 95%   BMI 33.86 kg/m²     Physical Exam  Constitutional: " well hydrated, well nourished, no acute distress. Vital signs reviewed  Head and face: normocephalic, atraumatic  Eyes: no erythema, edema or visible abnormalities of conjunctiva or lids. Pupils are equal, round and reactive to light. Extraocular movement normal  ENT: external ears without deformities  Neck: full ROM, no adenopathy, neck was suppl  Pulmonary: normal respiratory rate and effort. Lungs are clear to auscultation, no rales,no rhonchi or wheezing  Cardiovascular: regular rate and rhythm, normal S1 and S2, no murmur, no gallop, posterior tib. pulse normal 2+ bilaterally, no lower extremity edema  Abdomen: soft, non tender on palpation, not distended, normal BS in all 4 quadrants  Musculoskeletal: no joint swelling, normal movements of all 4 extremities. Gait and station: normal, Digits and nails: normal without clubbing  Skin: normal color, no rash  Neurologic: Cranial nerves: CN II: visual acuity intact. Source: acuity reported by patient. Pupils reactive to light with normal accommodation. Right and left pupil normal CN III, IV and VI: the EO movements were intact. CN VIII: no hearing loss. Sensory exam: normal light to touch  Psychiatric: Mood and affect: normal, Alert and Oriented x 3  Lymphatic: no cervical lymphadenopathy    Assessment/Plan   Encounter to discuss the recent test results: I shared and discussed the recent tests results with the pt in details  HLD: well controlled on simvastatin 10 mg PO daily  Hypothyroidism: the TSH level is trending down, T4 level is back to normal. Will continue on levothyroxine 25 mcg po daily  Decreased PLT: PLT count mildly decreased. I offered the evaluation with the hematology team. But pt declined it for now  Obesity, WHO class I:   I shared and discuss with the pt the treatment options with the GLP-1 receptor agonists such as semaglutide. Pt would like to proceed with the Tx. Pt denies any Fhx of MEN II syndrome  Will start on weekly injections 0.25 mg  subcutaneous  The side effects of the medication were discussed  The self injection technique was discussed  Will order the alcohol pads as well as sharps container  Plan was d/c with the pt in details, verbalized understanding, agreed  Please follow up with PCP as planned or sooner if needed

## 2024-01-11 DIAGNOSIS — E03.8 OTHER SPECIFIED HYPOTHYROIDISM: ICD-10-CM

## 2024-01-11 DIAGNOSIS — E78.49 OTHER HYPERLIPIDEMIA: ICD-10-CM

## 2024-01-15 ENCOUNTER — ANCILLARY PROCEDURE (OUTPATIENT)
Dept: RADIOLOGY | Facility: CLINIC | Age: 69
End: 2024-01-15
Payer: MEDICARE

## 2024-01-15 ENCOUNTER — LAB (OUTPATIENT)
Dept: LAB | Facility: LAB | Age: 69
End: 2024-01-15
Payer: MEDICARE

## 2024-01-15 ENCOUNTER — OFFICE VISIT (OUTPATIENT)
Dept: GASTROENTEROLOGY | Facility: CLINIC | Age: 69
End: 2024-01-15
Payer: MEDICARE

## 2024-01-15 VITALS
BODY MASS INDEX: 32.46 KG/M2 | WEIGHT: 161 LBS | SYSTOLIC BLOOD PRESSURE: 118 MMHG | HEIGHT: 59 IN | TEMPERATURE: 97 F | DIASTOLIC BLOOD PRESSURE: 82 MMHG | HEART RATE: 83 BPM

## 2024-01-15 VITALS — BODY MASS INDEX: 32.52 KG/M2 | HEIGHT: 59 IN

## 2024-01-15 DIAGNOSIS — R93.2 ABNORMAL FINDINGS ON DIAGNOSTIC IMAGING OF LIVER AND BILIARY TRACT: ICD-10-CM

## 2024-01-15 DIAGNOSIS — D69.6 THROMBOCYTOPENIA (CMS-HCC): ICD-10-CM

## 2024-01-15 DIAGNOSIS — K76.0 STEATOSIS OF LIVER: ICD-10-CM

## 2024-01-15 DIAGNOSIS — Z00.00 MEDICARE WELCOME EXAM: ICD-10-CM

## 2024-01-15 DIAGNOSIS — Z12.31 ENCOUNTER FOR SCREENING MAMMOGRAM FOR MALIGNANT NEOPLASM OF BREAST: ICD-10-CM

## 2024-01-15 PROBLEM — R05.3 CHRONIC COUGH: Status: ACTIVE | Noted: 2024-01-15

## 2024-01-15 PROBLEM — Z86.39 HISTORY OF ELEVATED LIPIDS: Status: ACTIVE | Noted: 2024-01-15

## 2024-01-15 PROBLEM — M25.569 ARTHRALGIA OF KNEE: Status: ACTIVE | Noted: 2024-01-15

## 2024-01-15 PROBLEM — R09.89 THROAT CLEARING: Status: ACTIVE | Noted: 2024-01-15

## 2024-01-15 PROBLEM — L81.9 DISORDER OF PIGMENTATION: Status: ACTIVE | Noted: 2020-04-06

## 2024-01-15 PROBLEM — I44.0 FIRST DEGREE AV BLOCK: Status: ACTIVE | Noted: 2024-01-15

## 2024-01-15 PROBLEM — M25.519 SHOULDER PAIN: Status: ACTIVE | Noted: 2024-01-15

## 2024-01-15 PROBLEM — B37.31 CANDIDIASIS OF VAGINA: Status: ACTIVE | Noted: 2024-01-15

## 2024-01-15 PROBLEM — M75.100 TEAR OF ROTATOR CUFF: Status: ACTIVE | Noted: 2024-01-15

## 2024-01-15 PROBLEM — R93.89 ABNORMAL MAGNETIC RESONANCE IMAGING OF CHEST: Status: ACTIVE | Noted: 2023-10-19

## 2024-01-15 PROBLEM — E55.9 VITAMIN D DEFICIENCY: Status: ACTIVE | Noted: 2024-01-15

## 2024-01-15 PROBLEM — M25.529 ELBOW PAIN: Status: ACTIVE | Noted: 2024-01-15

## 2024-01-15 PROBLEM — I44.0 FIRST DEGREE ATRIOVENTRICULAR BLOCK: Status: ACTIVE | Noted: 2022-06-09

## 2024-01-15 PROBLEM — R05.3 PERSISTENT COUGH: Status: ACTIVE | Noted: 2023-10-19

## 2024-01-15 PROBLEM — Z86.39 HISTORY OF HYPOTHYROIDISM: Status: ACTIVE | Noted: 2024-01-15

## 2024-01-15 PROBLEM — R31.9 HEMATURIA: Status: ACTIVE | Noted: 2024-01-15

## 2024-01-15 PROBLEM — R61 GENERALIZED HYPERHIDROSIS: Status: ACTIVE | Noted: 2022-06-09

## 2024-01-15 PROBLEM — J18.9 PNEUMONIA: Status: ACTIVE | Noted: 2024-01-15

## 2024-01-15 PROBLEM — R10.9 RIGHT FLANK PAIN: Status: ACTIVE | Noted: 2022-05-18

## 2024-01-15 PROBLEM — N32.81 OVERACTIVE BLADDER: Status: ACTIVE | Noted: 2024-01-15

## 2024-01-15 PROBLEM — G89.18 ACUTE POSTOPERATIVE PAIN: Status: ACTIVE | Noted: 2024-01-15

## 2024-01-15 PROBLEM — M67.919 DISORDER OF ROTATOR CUFF: Status: ACTIVE | Noted: 2024-01-15

## 2024-01-15 PROBLEM — R50.9 FEVER: Status: ACTIVE | Noted: 2024-01-15

## 2024-01-15 PROBLEM — M62.830 SPASM OF MUSCLE OF LOWER BACK: Status: ACTIVE | Noted: 2023-10-19

## 2024-01-15 PROBLEM — A09 GASTROINTESTINAL INFECTION: Status: ACTIVE | Noted: 2024-01-15

## 2024-01-15 PROBLEM — K57.92 DIVERTICULITIS OF INTESTINE: Status: ACTIVE | Noted: 2022-05-18

## 2024-01-15 PROBLEM — M25.562 KNEE PAIN, BILATERAL: Status: ACTIVE | Noted: 2024-01-15

## 2024-01-15 PROBLEM — M25.561 KNEE PAIN, BILATERAL: Status: ACTIVE | Noted: 2024-01-15

## 2024-01-15 LAB
A1AT SERPL NEPH-MCNC: 113 MG/DL (ref 84–218)
AFP SERPL-MCNC: 5 NG/ML (ref 0–9)
ALBUMIN SERPL BCP-MCNC: 4.3 G/DL (ref 3.4–5)
ALP SERPL-CCNC: 52 U/L (ref 33–136)
ALT SERPL W P-5'-P-CCNC: 23 U/L (ref 7–45)
ANION GAP SERPL CALC-SCNC: 10 MMOL/L (ref 10–20)
AST SERPL W P-5'-P-CCNC: 24 U/L (ref 9–39)
BASOPHILS # BLD AUTO: 0.02 X10*3/UL (ref 0–0.1)
BASOPHILS NFR BLD AUTO: 0.4 %
BILIRUB DIRECT SERPL-MCNC: 0.2 MG/DL (ref 0–0.3)
BILIRUB SERPL-MCNC: 1.3 MG/DL (ref 0–1.2)
BUN SERPL-MCNC: 11 MG/DL (ref 6–23)
CALCIUM SERPL-MCNC: 8.7 MG/DL (ref 8.6–10.3)
CERULOPLASMIN SERPL-MCNC: 23.5 MG/DL (ref 20–60)
CHLORIDE SERPL-SCNC: 105 MMOL/L (ref 98–107)
CO2 SERPL-SCNC: 28 MMOL/L (ref 21–32)
CREAT SERPL-MCNC: 0.79 MG/DL (ref 0.5–1.05)
EGFRCR SERPLBLD CKD-EPI 2021: 83 ML/MIN/1.73M*2
EOSINOPHIL # BLD AUTO: 0.13 X10*3/UL (ref 0–0.7)
EOSINOPHIL NFR BLD AUTO: 2.7 %
ERYTHROCYTE [DISTWIDTH] IN BLOOD BY AUTOMATED COUNT: 12.4 % (ref 11.5–14.5)
FERRITIN SERPL-MCNC: 268 NG/ML (ref 8–150)
GLUCOSE SERPL-MCNC: 100 MG/DL (ref 74–99)
HAV AB SER QL IA: REACTIVE
HBV CORE AB SER QL: NONREACTIVE
HBV SURFACE AB SER-ACNC: 23.8 MIU/ML
HBV SURFACE AG SERPL QL IA: NONREACTIVE
HCT VFR BLD AUTO: 44.6 % (ref 36–46)
HCV AB SER QL: NONREACTIVE
HGB BLD-MCNC: 15.4 G/DL (ref 12–16)
IMM GRANULOCYTES # BLD AUTO: 0.01 X10*3/UL (ref 0–0.7)
IMM GRANULOCYTES NFR BLD AUTO: 0.2 % (ref 0–0.9)
INR PPP: 1.1 (ref 0.9–1.1)
IRON SATN MFR SERPL: 23 % (ref 25–45)
IRON SERPL-MCNC: 84 UG/DL (ref 35–150)
LYMPHOCYTES # BLD AUTO: 1.76 X10*3/UL (ref 1.2–4.8)
LYMPHOCYTES NFR BLD AUTO: 37.1 %
MCH RBC QN AUTO: 32.8 PG (ref 26–34)
MCHC RBC AUTO-ENTMCNC: 34.5 G/DL (ref 32–36)
MCV RBC AUTO: 95 FL (ref 80–100)
MONOCYTES # BLD AUTO: 0.46 X10*3/UL (ref 0.1–1)
MONOCYTES NFR BLD AUTO: 9.7 %
NEUTROPHILS # BLD AUTO: 2.36 X10*3/UL (ref 1.2–7.7)
NEUTROPHILS NFR BLD AUTO: 49.9 %
NRBC BLD-RTO: 0 /100 WBCS (ref 0–0)
PLATELET # BLD AUTO: 147 X10*3/UL (ref 150–450)
POTASSIUM SERPL-SCNC: 3.4 MMOL/L (ref 3.5–5.3)
PROT SERPL-MCNC: 6.7 G/DL (ref 6.4–8.2)
PROTHROMBIN TIME: 12.1 SECONDS (ref 9.8–12.8)
RBC # BLD AUTO: 4.69 X10*6/UL (ref 4–5.2)
SODIUM SERPL-SCNC: 140 MMOL/L (ref 136–145)
TIBC SERPL-MCNC: 365 UG/DL (ref 240–445)
UIBC SERPL-MCNC: 281 UG/DL (ref 110–370)
WBC # BLD AUTO: 4.7 X10*3/UL (ref 4.4–11.3)

## 2024-01-15 PROCEDURE — 80053 COMPREHEN METABOLIC PANEL: CPT

## 2024-01-15 PROCEDURE — 82103 ALPHA-1-ANTITRYPSIN TOTAL: CPT

## 2024-01-15 PROCEDURE — 1036F TOBACCO NON-USER: CPT | Performed by: INTERNAL MEDICINE

## 2024-01-15 PROCEDURE — 86704 HEP B CORE ANTIBODY TOTAL: CPT

## 2024-01-15 PROCEDURE — 83540 ASSAY OF IRON: CPT

## 2024-01-15 PROCEDURE — 83550 IRON BINDING TEST: CPT

## 2024-01-15 PROCEDURE — 86381 MITOCHONDRIAL ANTIBODY EACH: CPT

## 2024-01-15 PROCEDURE — 1125F AMNT PAIN NOTED PAIN PRSNT: CPT | Performed by: INTERNAL MEDICINE

## 2024-01-15 PROCEDURE — 82390 ASSAY OF CERULOPLASMIN: CPT

## 2024-01-15 PROCEDURE — 1159F MED LIST DOCD IN RCRD: CPT | Performed by: INTERNAL MEDICINE

## 2024-01-15 PROCEDURE — 82728 ASSAY OF FERRITIN: CPT

## 2024-01-15 PROCEDURE — 99204 OFFICE O/P NEW MOD 45 MIN: CPT | Performed by: INTERNAL MEDICINE

## 2024-01-15 PROCEDURE — 99214 OFFICE O/P EST MOD 30 MIN: CPT | Performed by: INTERNAL MEDICINE

## 2024-01-15 PROCEDURE — 3008F BODY MASS INDEX DOCD: CPT | Performed by: INTERNAL MEDICINE

## 2024-01-15 PROCEDURE — 82248 BILIRUBIN DIRECT: CPT

## 2024-01-15 PROCEDURE — 86803 HEPATITIS C AB TEST: CPT

## 2024-01-15 PROCEDURE — 85610 PROTHROMBIN TIME: CPT

## 2024-01-15 PROCEDURE — 86706 HEP B SURFACE ANTIBODY: CPT

## 2024-01-15 PROCEDURE — 82105 ALPHA-FETOPROTEIN SERUM: CPT

## 2024-01-15 PROCEDURE — 86708 HEPATITIS A ANTIBODY: CPT

## 2024-01-15 PROCEDURE — 80321 ALCOHOLS BIOMARKERS 1OR 2: CPT

## 2024-01-15 PROCEDURE — 86038 ANTINUCLEAR ANTIBODIES: CPT

## 2024-01-15 PROCEDURE — 86015 ACTIN ANTIBODY EACH: CPT

## 2024-01-15 PROCEDURE — 77067 SCR MAMMO BI INCL CAD: CPT | Performed by: RADIOLOGY

## 2024-01-15 PROCEDURE — 77067 SCR MAMMO BI INCL CAD: CPT

## 2024-01-15 PROCEDURE — 87340 HEPATITIS B SURFACE AG IA: CPT

## 2024-01-15 PROCEDURE — 85025 COMPLETE CBC W/AUTO DIFF WBC: CPT

## 2024-01-15 PROCEDURE — 36415 COLL VENOUS BLD VENIPUNCTURE: CPT

## 2024-01-15 PROCEDURE — 77063 BREAST TOMOSYNTHESIS BI: CPT | Performed by: RADIOLOGY

## 2024-01-15 PROCEDURE — 86256 FLUORESCENT ANTIBODY TITER: CPT

## 2024-01-15 RX ORDER — LEVOTHYROXINE SODIUM 25 UG/1
25 TABLET ORAL
Qty: 90 TABLET | Refills: 0 | Status: SHIPPED | OUTPATIENT
Start: 2024-01-15 | End: 2024-04-30 | Stop reason: DRUGHIGH

## 2024-01-15 RX ORDER — SIMVASTATIN 10 MG/1
10 TABLET, FILM COATED ORAL NIGHTLY
Qty: 90 TABLET | Refills: 0 | Status: SHIPPED | OUTPATIENT
Start: 2024-01-15 | End: 2024-05-10

## 2024-01-15 ASSESSMENT — ENCOUNTER SYMPTOMS
CONSTIPATION: 0
ANAL BLEEDING: 0
SHORTNESS OF BREATH: 0
BLOOD IN STOOL: 0
NAUSEA: 0
TROUBLE SWALLOWING: 0
UNEXPECTED WEIGHT CHANGE: 0
RECTAL PAIN: 0
VOMITING: 0
DIARRHEA: 0
ABDOMINAL PAIN: 0
ABDOMINAL DISTENTION: 0
FEVER: 0
CHILLS: 0
COLOR CHANGE: 0

## 2024-01-15 NOTE — ASSESSMENT & PLAN NOTE
This patient most likely has Non Alcoholic Fatty Liver. We discussed natural history and the distinction between WELCH and NAFLD as well as long term implications of a diagnosis of WELCH and the increased risk of cardiovascular events associated with fatty liver  We encouraged diet and exercise  We will complete a work up for causes of chronic liver disease   we will order FIBROSCAN to assess for steatosis and fibrosis  we will see this patient in 6 weeks

## 2024-01-15 NOTE — PATIENT INSTRUCTIONS
[phone] (See below for department name when scheduling)    [x]  LABS:due date : Now (Labs can be done at any  location)   A workup for underlying causes of liver disease has been ordered.  This includes checking to see if you are protected against Hepatitis A & B. If you are not, it is recommended that anyone with chronic liver disease be vaccinated.               [x]  LIVER ELASTOGRAPHY aka Fibroscan (Department Gastro) (This test is not done in radiology)    Type of liver elastography.  A special ultrasound that measures scarring (fibrosis) and fat (steatosis) in the liver.   The Fibroscan is located at the following locations: [facility], [facility], [facility].    YOU SHOULD NOT EAT OR DRINK 3 HOURS BEFORE THE EXAM.                     [x]  FOLLOW UP (Department Gastro): due date : 6 Week       If you have any questions or need assistance, please don't hesitate to contact us.   Dr. Dunn: Office [phone] Fax: [phone]  Hepatology Nurse Coordinator: Ronel MAHER [phone]

## 2024-01-15 NOTE — PROGRESS NOTES
Subjective  An ultrasound reveals hepatomegaly and suggests steatosis in the setting of extensive metabolic risk currently on semaglutide. She denies fluid overload or congitive impairment. ALT is normal. Of concerns is relative thrombocytopenia.    Review of Systems   Constitutional:  Negative for chills, fever and unexpected weight change.   HENT:  Negative for trouble swallowing.    Respiratory:  Negative for shortness of breath.    Cardiovascular:  Negative for chest pain.   Gastrointestinal:  Negative for abdominal distention, abdominal pain, anal bleeding, blood in stool, constipation, diarrhea, nausea, rectal pain and vomiting.   Skin:  Negative for color change.       Physical Exam  Constitutional:       Appearance: Normal appearance.   HENT:      Head: Normocephalic and atraumatic.      Nose: No congestion or rhinorrhea.   Cardiovascular:      Rate and Rhythm: Normal rate and regular rhythm.   Pulmonary:      Effort: Pulmonary effort is normal.      Breath sounds: Normal breath sounds.   Abdominal:      General: Abdomen is flat.      Palpations: Abdomen is soft.   Musculoskeletal:         General: No tenderness.      Cervical back: No rigidity or tenderness.      Right lower leg: No edema.   Skin:     Coloration: Skin is not jaundiced.      Findings: No erythema.   Neurological:      General: No focal deficit present.      Mental Status: She is alert. Mental status is at baseline.   Psychiatric:         Mood and Affect: Mood normal.         Behavior: Behavior normal.     LIVER LABS:   Lab Results   Component Value Date    ALBUMIN 4.5 01/04/2024    BILITOT 1.8 (H) 01/04/2024    BILIDIR 0.2 12/17/2021    ALKPHOS 43 01/04/2024    ALT 26 01/04/2024    AST 31 01/04/2024    PROT 6.9 01/04/2024      Lab Results   Component Value Date    GLUCOSE 84 01/04/2024    CALCIUM 9.3 01/04/2024     01/04/2024    K 3.6 01/04/2024    CO2 26 01/04/2024     01/04/2024    BUN 15 01/04/2024    CREATININE 0.91  "01/04/2024     Lab Results   Component Value Date    WBC 5.7 01/04/2024    HGB 14.7 01/04/2024    HCT 42.2 01/04/2024    MCV 92 01/04/2024     (L) 01/04/2024     Lab Results   Component Value Date    INR 1.0 04/19/2019      Lab Results   Component Value Date     01/04/2024    CREATININE 0.91 01/04/2024    BILITOT 1.8 (H) 01/04/2024    INR 1.0 04/19/2019     No results found for: \"AFP\"   Lab Results   Component Value Date    INR 1.0 04/19/2019      No results found for: \"FERRITIN\", \"IRON\", \"IRONSAT\"   No results found for: \"HEPCAB\", \"HCVRNAPCR\", \"HCVGENO\"  No results found for: \"HEPBSAG\", \"HEPBSAB\", \"HEPBCAB\", \"HBVDNAPCRIUM\", \"HBVDNAQNPCRL\", \"HBVDNAPCR\"   No results found for: \"LGHY287\", \"YPZI534\"   === 10/28/23 ===    US ABDOMEN LIMITED LIVER    - Impression -  Enlarged liver with diffuse fatty infiltration. The patient is status  post cholecystectomy.    Otherwise, grossly unremarkable right upper quadrant ultrasound.    MACRO:  None    Signed by: Miguel Angel Bedolla 10/28/2023 5:34 PM  Dictation workstation:   [id number]      Fatty liver  This patient most likely has Non Alcoholic Fatty Liver. We discussed natural history and the distinction between WECLH and NAFLD as well as long term implications of a diagnosis of WELCH and the increased risk of cardiovascular events associated with fatty liver  We encouraged diet and exercise  We will complete a work up for causes of chronic liver disease   we will order FIBROSCAN to assess for steatosis and fibrosis  we will see this patient in 6 weeks     "

## 2024-01-16 ENCOUNTER — CONSULT (OUTPATIENT)
Dept: PRIMARY CARE | Facility: CLINIC | Age: 69
End: 2024-01-16
Payer: MEDICARE

## 2024-01-16 VITALS
WEIGHT: 161.4 LBS | DIASTOLIC BLOOD PRESSURE: 69 MMHG | SYSTOLIC BLOOD PRESSURE: 121 MMHG | HEART RATE: 74 BPM | BODY MASS INDEX: 32.54 KG/M2 | HEIGHT: 59 IN

## 2024-01-16 DIAGNOSIS — I83.93 VARICOSE VEINS OF BOTH LOWER EXTREMITIES, UNSPECIFIED WHETHER COMPLICATED: ICD-10-CM

## 2024-01-16 DIAGNOSIS — E66.09 CLASS 1 OBESITY DUE TO EXCESS CALORIES WITHOUT SERIOUS COMORBIDITY WITH BODY MASS INDEX (BMI) OF 30.0 TO 30.9 IN ADULT: ICD-10-CM

## 2024-01-16 DIAGNOSIS — I87.322 IDIOPATHIC CHRONIC VENOUS HYPERTENSION OF LEFT LEG WITH INFLAMMATION: Primary | ICD-10-CM

## 2024-01-16 DIAGNOSIS — I87.321 IDIOPATHIC CHRONIC VENOUS HYPERTENSION OF RIGHT LEG WITH INFLAMMATION: ICD-10-CM

## 2024-01-16 LAB — MITOCHONDRIA AB SER QL IF: ABNORMAL

## 2024-01-16 PROCEDURE — 99214 OFFICE O/P EST MOD 30 MIN: CPT | Performed by: INTERNAL MEDICINE

## 2024-01-16 NOTE — PROGRESS NOTES
Problem List Items Addressed This Visit       Class 1 obesity without serious comorbidity with body mass index (BMI) of 30.0 to 30.9 in adult    Idiopathic chronic venous hypertension of left leg with inflammation - Primary    Relevant Orders    Compression Stockings 30-40 mmHg    Vascular US Lower Extremity Vein Mapping Bilateral    Idiopathic chronic venous hypertension of right leg with inflammation    Relevant Orders    Compression Stockings 30-40 mmHg    Vascular US Lower Extremity Vein Mapping Bilateral    Varicose veins of both lower extremities    Relevant Orders    Compression Stockings 30-40 mmHg    Vascular US Lower Extremity Vein Mapping Bilateral       Subjective     Patient ID: Cristy Morgan is a 68 y.o. female who presents for Consult vein- referred  by Dr. Ghotra.    Orders Placed This Encounter   Procedures    Compression Stockings 30-40 mmHg     Bilateral Thigh high        Lab Results   Component Value Date    WBC 4.7 01/15/2024    HGB 15.4 01/15/2024    HCT 44.6 01/15/2024     (L) 01/15/2024    CHOL 154 01/04/2024    TRIG 122 01/04/2024    HDL 52.8 01/04/2024    ALT 23 01/15/2024    AST 24 01/15/2024     01/15/2024    K 3.4 (L) 01/15/2024     01/15/2024    CREATININE 0.79 01/15/2024    BUN 11 01/15/2024    CO2 28 01/15/2024    TSH 5.04 (H) 01/04/2024    INR 1.1 01/15/2024    HGBA1C 5.7 (H) 10/24/2023     Lab Results   Component Value Date    CHOL 154 01/04/2024    LDLCALC 77 01/04/2024    CHHDL 2.9 01/04/2024       HPI:  68 years old female who is generally active at this time taking care of her mother has family history of varicose vein disease came in because of her bilateral leg symptoms and the visible veins in her legs.  She also has discolorations and intermittent swellings which are bothering her.    Bilateral Leg Symptoms:  Current symptoms include Leg pain, swelling, cramps on and off  The severity is moderate.  Aggravating factors include prolonged  sitting/standing, walking.  Alleviating factors include leg elevation.  Activities of Daily Living The patient's symptoms are worse later in the day,   The patient reports that the symptoms interfere with day to day activities.      The patient is being seen for a consultation regarding leg symptoms with discolored and dilated veins. Symptoms: Dilated veins, discolored veins, leg pain, leg swelling and muscle cramps. The patient is currently experiencing symptoms. Symptoms are located on both sides.. The patient describes the pain as aching, heavy, throbbing and stinging. Onset was gradual More than 3-6 ago. The symptoms occur intermittently. The episodes occur daily mostly at the end of the day. She describes this as moderate in severity and worsening. Exacerbating factors:  leg dependency, prolonged sitting and prolonged standing. Relieving factors:  elevation, exercises and support stockings. Associated symptoms:  spider veins. Current treatment includes leg elevation. By report, there is fair compliance with treatment, fair tolerance of treatment and poor symptom control. Initial presentation included bulging veins, dilated veins and leg pain. Since diagnosis the disease has been worsening. Recently, the disease has been worsening. Disease complications:  chronic edema. Pertinent medical history:  no deep venous thrombosis, no hypercoagulable state, no pulmonary embolism and no thrombophlebitis. Risk factors:  family history of varicose veins.     ROS:  Respiratory:  No shortness of breath.  No cough, sinus congestion    Cardiovascular:    No chest pain.  Denies claudication.  Denies cyanosis.  Palpitations, denies.    Neurologic:    No significant symptoms of tingling/Numbness.  No loss of strength.    Social History:  No tobacco Use:    Current Outpatient Medications on File Prior to Visit   Medication Sig Dispense Refill    EPINEPHrine (Auvi-Q) 0.3 mg/0.3 mL injection syringe As Directed      levothyroxine  "(Synthroid, Levoxyl) 25 mcg tablet Take 1 tablet (25 mcg) by mouth once daily in the morning. Take before meals. 90 tablet 0    simvastatin (Zocor) 10 mg tablet Take 1 tablet (10 mg) by mouth once daily at bedtime. 90 tablet 0    alcohol swabs (Alcohol Pads) pads, medicated Apply 100 Swabs topically 1 (one) time per week. (Patient not taking: Reported on 1/16/2024) 30 each 0    biotin 1 mg tablet Biotin 1000 MCG Oral Tablet   Refills: 0       Active      calcium carb-D3-magnesium erna 133 mg calcium -133 unit-67 mg capsule Take by mouth once daily.      cholecalciferol (Vitamin D-3) 50 MCG (2000 UT) tablet Take 1 tablet (2,000 Units) by mouth once daily.      diphenhydrAMINE (Benadryl Allergy) 25 mg tablet 1 tab(s) orally once a day (at bedtime), As Needed      multivit-min/ferrous fumarate (MULTI VITAMIN ORAL) Multi Vitamin Oral Tablet   Refills: 0       Active      multivitamin capsule Take 1 capsule by mouth once daily.      omega 3-dha-epa-fish oil 1,000 mg (120 mg-180 mg) capsule 1 cap(s) orally once a day      semaglutide 0.25 mg or 0.5 mg (2 mg/3 mL) pen injector Inject 0.25 mg under the skin 1 (one) time per week. (Patient not taking: Reported on 1/16/2024) 3 mL 0    [DISCONTINUED] levothyroxine (Synthroid) 25 mcg tablet Take 1 tablet (25 mcg) by mouth once daily in the morning. Take before meals. 30 tablet 11    [DISCONTINUED] simvastatin (Zocor) 10 mg tablet Take 1 tablet (10 mg) by mouth once daily at bedtime. 30 tablet 5     No current facility-administered medications on file prior to visit.        Allergies   Allergen Reactions    Bee Venom Protein (Honey Bee) Anaphylaxis, Shortness of breath, Swelling and Unknown     throat swells    Feathers Shortness of breath     fertilizer    Gluten Hives and Shortness of breath    Nut - Unspecified Hives and Shortness of breath     *pine nuts    Nutritional Supplement-Fiber Hives and Anaphylaxis    Other Shortness of breath and Hives     \"Tobacco\"    Pepper " (Genus Capsicum) Hives and Shortness of breath     peppers    Potato Hives and Shortness of breath    Pseudoephedrine Hcl Shortness of breath    Wheat Hives and Shortness of breath    Whey Hives and Shortness of breath     Whey    Barium Iodide Unknown    Barium Sulfate Hives    Eggplant GI Upset and Other     eggplant, severe gi upset    Fish Containing Products Hives and Nausea/vomiting     NO REACTION WITH SHELLFISH    Fish Derived Nausea/vomiting    Gadolinium-Containing Contrast Media Unknown    Grass Pollen Unknown    House Dust Mite Unknown    Iodinated Contrast Media Hives     barium, severe gi upset    Milk Containing Products (Dairy) Unknown    Mold Unknown    Oxycodone-Acetaminophen GI Upset     severe gi upset    Paprika Hives     paprika    Perfume Hives    Prednisone Swelling    Pregnenolone Unknown    Tomato GI Upset, Hives and Other     severe gi upset    Tree And Shrub Pollen Unknown    Tree Nuts Hives    Peanut Unknown, Hives, Itching, Rash and Swelling    Tree Nut Hives, Rash and Swelling     Pine, filbert, hazelnut, pecan        Examination:    Visit Vitals  OB Status Postmenopausal   Smoking Status Never        Normal-built, well-nourished  with no apparent distress. Alert oriented  Skin:  Normal turgor.  No rash.  Head:  Normocephalic, atraumatic.  Eyes:  Pupils are equal, round,.  No pallor of conjunctivae.  Wearing a mask  Neck:  Supple.  No JVD.  No carotid bruit.  No thyromegaly. No cervical lymphadenopathy.  No clubbing  Chest:  Vesicular breathing. Bilaterally good air entry.  No wheezing.  No crackles.  Heart:  Regular rate and rhythm.  S1, S2 positive.  No murmur.  Abdomen:  Soft and nontender.  Bowel sounds are positive.  No organomegaly.  Extremities: Detailed leg exam below.  Bilaterally 2+ dorsalis pedis pulses.  No calf tenderness. Homans sign is negative.  Neuro Exam: No focal signs. Gait is normal.    Venous Exam:  Varicose veins in left calf absent  Varicose veins in left thigh  absent  Varicose veins in right calf absent  Varicose veins in right thigh absent  Reticular veins in left calf present  Reticular veins in left thigh absent  Reticular veins in right calf present  Reticular veins in right thigh present  Telangiectasias in left calf present  Telangiectasias in left thigh absent  Telangiectasias in right calf present  Telangiectasias in right thigh absent  Right leg trace edema present  Left leg trace edema present  Hyperpigmentation in left leg absent  Hyperpigmentation in right leg absent  Lipodermatosclerosis in right leg absent  Lipodermatosclerosis in left leg absent  Dermatitis in left leg absent  Dermatitis in right leg absent  Corona phlebectatica in left leg absent).  Corona phlebectatica in right leg absent  Atrophe wyatt in left leg absent  Atrophe wyatt in right leg absent  Ulcer(s) in left leg absent  Ulcer(s) in right leg absent    CEAP Classification  Right leg CEAP C 3S Ep  Left leg CEAP C 3S Ep    Assessment:  Problem List Items Addressed This Visit       Class 1 obesity without serious comorbidity with body mass index (BMI) of 30.0 to 30.9 in adult    Idiopathic chronic venous hypertension of left leg with inflammation - Primary    Relevant Orders    Compression Stockings 30-40 mmHg    Vascular US Lower Extremity Vein Mapping Bilateral    Idiopathic chronic venous hypertension of right leg with inflammation    Relevant Orders    Compression Stockings 30-40 mmHg    Vascular US Lower Extremity Vein Mapping Bilateral    Varicose veins of both lower extremities    Relevant Orders    Compression Stockings 30-40 mmHg    Vascular US Lower Extremity Vein Mapping Bilateral        Plan     1. Varicose veins of bilateral lower extremities with other complications  Start graduated thigh high compression stockings 30 - 40 mm Hg during the wakeup/ day time. Rx GCS today  -Begin 6 weeks trial of conservative therapy with GCS. Trial begins today  -Schedule for B/L DUS mapping  and treatment planning as discussed. Patient Educated with: Varicose  Veins and hand out was given to the patient.    Discussions    1. Varicose veins of leg with pain  Patient has symptoms of probably early chronic venous insufficiency which collaborates with patient's examination as noted above. During this visit, we had a detailed discussion about the pathophysiology of the venous disease and the conservative managements.  We discussed the genetic factors involved in the chronic venous insufficiency and also emphasize the environmental factors such as obesity, lifestyle which could contributes to the worsening of the chronic venous insufficiency. The patient will continue to have the regular activities including walking. The patient also will do all the other conservative management including graduated compression stockings 30 to 40 mmHg thigh-high during the daytime as we prescribed.   We also discussed in details about the importance of complete treatment of the pathophysiology starting from the venous reflux if it is progressing from the deep junction by endovenous ablation of large truncal superficial veins and then treating the large refluxing tributaries with ultrasound-guided foam sclerotherapy to improve the symptoms and venous return. Handouts were given and patient verbalizes the understanding of our discussions.  The patient will follow up with us in about five week´s time for a detailed ultrasound evaluation which will give us the information about the abnormally refluxing superficial veins. After the study the abnormal venous system will be illustrated in the map form.  The patient will return in about  six weeks for re- evaluation (after insurance required conservative treatment trial) and discussions of the ultrasound evaluation results to decide about any further interventions, which are appropriate to relieve symptoms and prevent future complications from chronic vein disease.    Patient  "Education  RECOMMENDATIONS FOR BETTER LEG CARE  REGULAR EXERCISE  Walking, running, aerobics, swimming, elliptical machine, or biking for 30 minutes, 5 days per week will help reduce aching, pain and tiredness of your legs.  ELEVATE YOUR LEGS  Elevating your legs, heels slightly above chest level for at least 10 minutes, once or twice daily may diminish aching and swelling.  MOVE YOUR LEGS FREQUENTLY  Flexing your ankles will pump blood out of your legs like walking does.  Repeat this every 10 minutes while standing or sitting and try to walk for at least 2 minutes every 1/2 hour.  AVOID WEARING HEELS  Wearing high heels interferes with the normal pumping action that occurs when you walk and may lead to aching and cramping of the legs.  MAINTAIN PROPER WEIGHT  Even moderate weight loss may reduce aching in the legs due to varicose veins and diminish the rate at which spider veins develop.  WEAR SUPPORT HOSE  Available at pharmacies and medical supply stores.  There are many brands to choose from.  Lighter support hose are available at department stores.  However, it is best to wear stockings that are \"graduated\".  This will most efficiently improve your vein function.  Moderate Strength: 20-30 mm Hg   Heavy Strength: 30-40 mm Hg (prescription required)  Very Heavy Strength: 40-50 mm Hg (prescription required)  SYMPTOM CONTROL WITH MEDICATIONS  Non-steroidal analgesics (NSAIDS) have been useful in controlling symptoms and reducing inflammation.  If you do not have a contraindication, allergy or intolerance to these medications, small doses may be used to alleviate symptoms.  Advice your practitioner if you have experienced prior adverse effects to these medications as alternative analgesics may be used.  For additional information - go to  [email or web address] and open the patient information tab    Counseling.  The patient was counseled regarding instructions for management, risk factor reductions, prognosis, patient " and family education, impressions, risks and benefits of treatment options and importance of compliance with treatment. total time of encounter was 42 minutes and 34 minutes was spent counseling.

## 2024-01-17 LAB
ANA SER QL HEP2 SUBST: NEGATIVE
SMOOTH MUSCLE AB SER QL IF: ABNORMAL

## 2024-01-18 LAB
LABORATORY REPORT: NORMAL
PETH INTERPRETATION: NORMAL
PLPETH BLD-MCNC: <10 NG/ML
POPETH BLD-MCNC: <10 NG/ML

## 2024-01-19 ENCOUNTER — OFFICE VISIT (OUTPATIENT)
Dept: ENDOCRINOLOGY | Facility: CLINIC | Age: 69
End: 2024-01-19
Payer: MEDICARE

## 2024-01-19 ENCOUNTER — TELEPHONE (OUTPATIENT)
Dept: PRIMARY CARE | Facility: CLINIC | Age: 69
End: 2024-01-19

## 2024-01-19 VITALS
SYSTOLIC BLOOD PRESSURE: 119 MMHG | WEIGHT: 159 LBS | BODY MASS INDEX: 32.05 KG/M2 | HEART RATE: 76 BPM | HEIGHT: 59 IN | DIASTOLIC BLOOD PRESSURE: 74 MMHG

## 2024-01-19 DIAGNOSIS — M81.0 AGE RELATED OSTEOPOROSIS, UNSPECIFIED PATHOLOGICAL FRACTURE PRESENCE: ICD-10-CM

## 2024-01-19 DIAGNOSIS — E03.9 HYPOTHYROIDISM, UNSPECIFIED TYPE: Primary | ICD-10-CM

## 2024-01-19 PROCEDURE — 1159F MED LIST DOCD IN RCRD: CPT | Performed by: INTERNAL MEDICINE

## 2024-01-19 PROCEDURE — 3008F BODY MASS INDEX DOCD: CPT | Performed by: INTERNAL MEDICINE

## 2024-01-19 PROCEDURE — 99204 OFFICE O/P NEW MOD 45 MIN: CPT | Performed by: INTERNAL MEDICINE

## 2024-01-19 PROCEDURE — 1036F TOBACCO NON-USER: CPT | Performed by: INTERNAL MEDICINE

## 2024-01-19 PROCEDURE — 1125F AMNT PAIN NOTED PAIN PRSNT: CPT | Performed by: INTERNAL MEDICINE

## 2024-01-19 PROCEDURE — 1160F RVW MEDS BY RX/DR IN RCRD: CPT | Performed by: INTERNAL MEDICINE

## 2024-01-19 NOTE — TELEPHONE ENCOUNTER
----- Message from Evelin Ghotra MD sent at 1/17/2024  3:04 PM EST -----  Please inform the pt that the recent screening mammogram showed no mammographic evidence of malignancy.  Recommendation:  Routine Screening Mammogram in 1 Year.  Thank you

## 2024-01-19 NOTE — TELEPHONE ENCOUNTER
Telephone call to patient, spoke to patient, informed her of normal mammogram results, advised her to repeat in 1 year
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
## 2024-01-22 DIAGNOSIS — E03.9 HYPOTHYROIDISM, UNSPECIFIED TYPE: Primary | ICD-10-CM

## 2024-01-22 RX ORDER — LEVOTHYROXINE SODIUM 50 UG/1
50 TABLET ORAL DAILY
Qty: 30 TABLET | Refills: 11 | Status: SHIPPED | OUTPATIENT
Start: 2024-01-22 | End: 2024-04-30 | Stop reason: DRUGHIGH

## 2024-01-23 ENCOUNTER — LAB (OUTPATIENT)
Dept: LAB | Facility: LAB | Age: 69
End: 2024-01-23
Payer: MEDICARE

## 2024-01-23 DIAGNOSIS — M81.0 AGE RELATED OSTEOPOROSIS, UNSPECIFIED PATHOLOGICAL FRACTURE PRESENCE: ICD-10-CM

## 2024-01-23 DIAGNOSIS — E03.9 HYPOTHYROIDISM, UNSPECIFIED TYPE: ICD-10-CM

## 2024-01-23 LAB
25(OH)D3 SERPL-MCNC: 33 NG/ML (ref 30–100)
ANION GAP SERPL CALC-SCNC: 13 MMOL/L (ref 10–20)
BUN SERPL-MCNC: 11 MG/DL (ref 6–23)
CALCIUM SERPL-MCNC: 8.9 MG/DL (ref 8.6–10.3)
CHLORIDE SERPL-SCNC: 106 MMOL/L (ref 98–107)
CO2 SERPL-SCNC: 27 MMOL/L (ref 21–32)
CREAT SERPL-MCNC: 0.92 MG/DL (ref 0.5–1.05)
EGFRCR SERPLBLD CKD-EPI 2021: 69 ML/MIN/1.73M*2
GLUCOSE SERPL-MCNC: 113 MG/DL (ref 74–99)
POTASSIUM SERPL-SCNC: 3.7 MMOL/L (ref 3.5–5.3)
PTH-INTACT SERPL-MCNC: 54.4 PG/ML (ref 18.5–88)
SODIUM SERPL-SCNC: 142 MMOL/L (ref 136–145)
THYROPEROXIDASE AB SERPL-ACNC: <28 IU/ML
TSH SERPL-ACNC: 6.26 MIU/L (ref 0.44–3.98)

## 2024-01-23 PROCEDURE — 82306 VITAMIN D 25 HYDROXY: CPT

## 2024-01-23 PROCEDURE — 80048 BASIC METABOLIC PNL TOTAL CA: CPT

## 2024-01-23 PROCEDURE — 82523 COLLAGEN CROSSLINKS: CPT

## 2024-01-23 PROCEDURE — 86376 MICROSOMAL ANTIBODY EACH: CPT

## 2024-01-23 PROCEDURE — 84443 ASSAY THYROID STIM HORMONE: CPT

## 2024-01-23 PROCEDURE — 36415 COLL VENOUS BLD VENIPUNCTURE: CPT

## 2024-01-23 PROCEDURE — 83970 ASSAY OF PARATHORMONE: CPT

## 2024-01-25 LAB — COLLAGEN CTX SERPL-MCNC: 371 PG/ML

## 2024-01-26 ENCOUNTER — OFFICE VISIT (OUTPATIENT)
Dept: DERMATOLOGY | Facility: CLINIC | Age: 69
End: 2024-01-26
Payer: MEDICARE

## 2024-01-26 DIAGNOSIS — D22.9 MELANOCYTIC NEVUS, UNSPECIFIED LOCATION: ICD-10-CM

## 2024-01-26 DIAGNOSIS — L81.4 LENTIGO: ICD-10-CM

## 2024-01-26 DIAGNOSIS — L30.9 DERMATITIS: ICD-10-CM

## 2024-01-26 DIAGNOSIS — D18.01 HEMANGIOMA OF SKIN: ICD-10-CM

## 2024-01-26 DIAGNOSIS — L57.8 OTHER SKIN CHANGES DUE TO CHRONIC EXPOSURE TO NONIONIZING RADIATION: ICD-10-CM

## 2024-01-26 DIAGNOSIS — L82.1 SEBORRHEIC KERATOSIS: ICD-10-CM

## 2024-01-26 DIAGNOSIS — D48.5 NEOPLASM OF UNCERTAIN BEHAVIOR OF SKIN: Primary | ICD-10-CM

## 2024-01-26 LAB
COLLAGEN NTX/CREAT UR-SRTO: 24
CREAT UR-MCNC: 259 MG/DL

## 2024-01-26 PROCEDURE — 1160F RVW MEDS BY RX/DR IN RCRD: CPT | Performed by: STUDENT IN AN ORGANIZED HEALTH CARE EDUCATION/TRAINING PROGRAM

## 2024-01-26 PROCEDURE — 1125F AMNT PAIN NOTED PAIN PRSNT: CPT | Performed by: STUDENT IN AN ORGANIZED HEALTH CARE EDUCATION/TRAINING PROGRAM

## 2024-01-26 PROCEDURE — 88305 TISSUE EXAM BY PATHOLOGIST: CPT | Performed by: DERMATOLOGY

## 2024-01-26 PROCEDURE — 56605 BIOPSY OF VULVA/PERINEUM: CPT | Performed by: STUDENT IN AN ORGANIZED HEALTH CARE EDUCATION/TRAINING PROGRAM

## 2024-01-26 PROCEDURE — 1159F MED LIST DOCD IN RCRD: CPT | Performed by: STUDENT IN AN ORGANIZED HEALTH CARE EDUCATION/TRAINING PROGRAM

## 2024-01-26 PROCEDURE — 1036F TOBACCO NON-USER: CPT | Performed by: STUDENT IN AN ORGANIZED HEALTH CARE EDUCATION/TRAINING PROGRAM

## 2024-01-26 PROCEDURE — 99204 OFFICE O/P NEW MOD 45 MIN: CPT | Performed by: STUDENT IN AN ORGANIZED HEALTH CARE EDUCATION/TRAINING PROGRAM

## 2024-01-26 PROCEDURE — 3008F BODY MASS INDEX DOCD: CPT | Performed by: STUDENT IN AN ORGANIZED HEALTH CARE EDUCATION/TRAINING PROGRAM

## 2024-01-26 RX ORDER — HYDROCORTISONE 25 MG/G
OINTMENT TOPICAL 2 TIMES DAILY
Qty: 30 G | Refills: 3 | Status: SHIPPED | OUTPATIENT
Start: 2024-01-26

## 2024-01-26 NOTE — PROGRESS NOTES
Subjective     Cristy Morgan is a 68 y.o. female who presents for the following: Suspicious Skin Lesion (FBSE. Concerns with dryness on back, Upper right groin raised, dark bump.).     Review of Systems:  No other skin or systemic complaints other than what is documented elsewhere in the note.    The following portions of the chart were reviewed this encounter and updated as appropriate:          Skin Cancer History  No skin cancer on file.      Specialty Problems          Dermatology Problems    Disorder of pigmentation    Hemangioma of skin and subcutaneous tissue    Other melanin hyperpigmentation    Other seborrheic keratosis    Other specified follicular disorders    Skin changes due to chronic exposure to nonionizing radiation, unspecified    Alopecia    Cyst of skin    Elbow contusion        Objective   Well appearing patient in no apparent distress; mood and affect are within normal limits.    A focused skin examination was performed. All findings within normal limits unless otherwise noted below.    Assessment/Plan   1. Neoplasm of uncertain behavior of skin  Right Labium Majus            Lesion biopsy  Type of biopsy: tangential    Informed consent: discussed and consent obtained    Timeout: patient name, date of birth, surgical site, and procedure verified    Procedure prep:  Patient was prepped and draped  Anesthesia: the lesion was anesthetized in a standard fashion    Anesthetic:  1% lidocaine w/ epinephrine 1-100,000 local infiltration  Instrument used: DermaBlade    Hemostasis achieved with: aluminum chloride    Outcome: patient tolerated procedure well    Post-procedure details: sterile dressing applied and wound care instructions given    Dressing type: petrolatum and bandage      Staff Communication: Dermatology Local Anesthesia: 1 % Lidocaine / Epinephrine - Amount: 1 ml    Specimen 1 - Dermatopathology- DERM LAB  Differential Diagnosis: wart vs vascular neoplasm vs other  Check Margins  Yes/No?:    Comments:    Dermpath Lab: Routine Histopathology (formalin-fixed tissue)        2. Other skin changes due to chronic exposure to nonionizing radiation  Mottled pigmentation, telangiectasias and brown reticular macules in sun exposed areas on the face, extremities, trunk    The risk of chronic, cumulative sun damage and risk of development of skin cancer was reviewed with the patient today. Discussed important of sun protection with sun protective clothing and/or broad spectrum sunscreen spf 30 or above.  Warning signs of skin cancer were reviewed. Patient to contact office should they notice any new or changing pre-existing skin lesion.    3. Melanocytic nevus, unspecified location  Benign to slightly atypical appearing brown pigmented macules and papules    Clinically benign appearing nevi, reassurance provided to the patient today. Discussed important of sun protection with sun protective clothing and/or broad spectrum sunscreen spf 30 or above.  ABCDEs of melanoma reviewed. Patient to f/u should they notice any new or changing pre-existing skin lesion.    4. Seborrheic keratosis  Stuck on verrucous, tan-brown papules and plaques.      The benign nature of these skin lesions were reviewed with the patient today and reassurance was provided. Patient advised to return for f/u if the lesions change in size, shape, color, become painful, tender, itch or bleed.    5. Hemangioma of skin  Bright red papules    Discussed benign nature of condition, reassured. Reviewed warning signs of skin cancer with patient.    6. Lentigo  Scattered tan macules in sun-exposed areas.    Benign nature of these skin lesions reviewed and relation to sun exposure discussed. Reassurance provided. Reviewed warning signs of skin cancer.    7. Dermatitis  Head - Anterior (Face)  Erythema and scaling around eyelids    Likely ACD  Patient reports multiple allergies  Constantly getting rashes on face and neck  Patient is very stressed  with home life and she is caring for her elderly mother with dementia  Consider patch testing in future  Start hydrocortisone 2.5% ointment. Patient to apply to affected areas 2x daily x 2 weeks then 1 week off, repeat as needed. Side effects of topical steroids were reviewed including risk of skin atrophy.      hydrocortisone 2.5 % ointment - Head - Anterior (Face)  Apply topically 2 times a day. Up to 14 days as needed for rash/itching

## 2024-01-29 DIAGNOSIS — M81.0 OSTEOPOROSIS WITHOUT CURRENT PATHOLOGICAL FRACTURE, UNSPECIFIED OSTEOPOROSIS TYPE: Primary | ICD-10-CM

## 2024-01-29 RX ORDER — ALENDRONATE SODIUM 70 MG/1
70 TABLET ORAL
Qty: 4 TABLET | Refills: 11 | Status: SHIPPED | OUTPATIENT
Start: 2024-01-29 | End: 2025-01-28

## 2024-01-29 RX ORDER — ALENDRONATE SODIUM 70 MG/1
70 TABLET ORAL
Qty: 4 TABLET | Refills: 11 | Status: SHIPPED | OUTPATIENT
Start: 2024-01-29 | End: 2024-01-29 | Stop reason: SDUPTHER

## 2024-01-29 NOTE — PROGRESS NOTES
Baseline:   C Telopeptide: 371   Ntx Urine: 24   Major OP Fx: 31  Hip Fx: 3.7   No previous Tx for OP   Alendronate 70 mg orally weekly ordered today (1/29/24) to be taken as indicated. Pt. is aware and agreeable to plan.   She is aware of the side effects and is following with the dentist every 6 months.

## 2024-01-30 LAB
LABORATORY COMMENT REPORT: NORMAL
PATH REPORT.FINAL DX SPEC: NORMAL
PATH REPORT.GROSS SPEC: NORMAL
PATH REPORT.RELEVANT HX SPEC: NORMAL
PATH REPORT.TOTAL CANCER: NORMAL

## 2024-01-31 ENCOUNTER — CLINICAL SUPPORT (OUTPATIENT)
Dept: GASTROENTEROLOGY | Facility: CLINIC | Age: 69
End: 2024-01-31
Payer: MEDICARE

## 2024-01-31 DIAGNOSIS — D69.6 THROMBOCYTOPENIA (CMS-HCC): ICD-10-CM

## 2024-01-31 DIAGNOSIS — K76.0 STEATOSIS OF LIVER: ICD-10-CM

## 2024-01-31 PROCEDURE — 91200 LIVER ELASTOGRAPHY: CPT | Mod: CH | Performed by: INTERNAL MEDICINE

## 2024-02-02 NOTE — RESULT ENCOUNTER NOTE
Informed patient: Per Dr. 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 Instructed patient to share results with gynecologist and to also follow up with Dr. Orellana if any more warts should develop.

## 2024-02-09 ENCOUNTER — APPOINTMENT (OUTPATIENT)
Dept: PRIMARY CARE | Facility: CLINIC | Age: 69
End: 2024-02-09
Payer: MEDICARE

## 2024-02-22 ENCOUNTER — APPOINTMENT (OUTPATIENT)
Dept: RADIOLOGY | Facility: HOSPITAL | Age: 69
End: 2024-02-22
Payer: MEDICARE

## 2024-02-22 ENCOUNTER — HOSPITAL ENCOUNTER (EMERGENCY)
Facility: HOSPITAL | Age: 69
Discharge: HOME | End: 2024-02-22
Attending: STUDENT IN AN ORGANIZED HEALTH CARE EDUCATION/TRAINING PROGRAM
Payer: MEDICARE

## 2024-02-22 VITALS
DIASTOLIC BLOOD PRESSURE: 66 MMHG | HEART RATE: 72 BPM | RESPIRATION RATE: 18 BRPM | SYSTOLIC BLOOD PRESSURE: 136 MMHG | OXYGEN SATURATION: 99 % | HEIGHT: 59 IN | TEMPERATURE: 98.8 F | WEIGHT: 159 LBS | BODY MASS INDEX: 32.05 KG/M2

## 2024-02-22 DIAGNOSIS — M79.672 LEFT FOOT PAIN: Primary | ICD-10-CM

## 2024-02-22 PROCEDURE — 73630 X-RAY EXAM OF FOOT: CPT | Mod: LEFT SIDE | Performed by: STUDENT IN AN ORGANIZED HEALTH CARE EDUCATION/TRAINING PROGRAM

## 2024-02-22 PROCEDURE — 99284 EMERGENCY DEPT VISIT MOD MDM: CPT | Performed by: STUDENT IN AN ORGANIZED HEALTH CARE EDUCATION/TRAINING PROGRAM

## 2024-02-22 PROCEDURE — 73630 X-RAY EXAM OF FOOT: CPT | Mod: LT

## 2024-02-22 PROCEDURE — 2500000001 HC RX 250 WO HCPCS SELF ADMINISTERED DRUGS (ALT 637 FOR MEDICARE OP): Performed by: STUDENT IN AN ORGANIZED HEALTH CARE EDUCATION/TRAINING PROGRAM

## 2024-02-22 PROCEDURE — 99283 EMERGENCY DEPT VISIT LOW MDM: CPT | Mod: 25

## 2024-02-22 RX ORDER — ACETAMINOPHEN 325 MG/1
975 TABLET ORAL ONCE
Status: COMPLETED | OUTPATIENT
Start: 2024-02-22 | End: 2024-02-22

## 2024-02-22 RX ORDER — OXYCODONE HYDROCHLORIDE 5 MG/1
5 TABLET ORAL ONCE
Status: COMPLETED | OUTPATIENT
Start: 2024-02-22 | End: 2024-02-22

## 2024-02-22 RX ADMIN — ACETAMINOPHEN 975 MG: 325 TABLET ORAL at 09:16

## 2024-02-22 RX ADMIN — OXYCODONE HYDROCHLORIDE 5 MG: 5 TABLET ORAL at 09:16

## 2024-02-22 ASSESSMENT — PAIN - FUNCTIONAL ASSESSMENT
PAIN_FUNCTIONAL_ASSESSMENT: 0-10
PAIN_FUNCTIONAL_ASSESSMENT: 0-10

## 2024-02-22 ASSESSMENT — LIFESTYLE VARIABLES
HAVE YOU EVER FELT YOU SHOULD CUT DOWN ON YOUR DRINKING: NO
HAVE PEOPLE ANNOYED YOU BY CRITICIZING YOUR DRINKING: NO
EVER FELT BAD OR GUILTY ABOUT YOUR DRINKING: NO
EVER HAD A DRINK FIRST THING IN THE MORNING TO STEADY YOUR NERVES TO GET RID OF A HANGOVER: NO

## 2024-02-22 ASSESSMENT — PAIN DESCRIPTION - ORIENTATION: ORIENTATION: LEFT

## 2024-02-22 ASSESSMENT — PAIN SCALES - GENERAL
PAINLEVEL_OUTOF10: 2
PAINLEVEL_OUTOF10: 10 - WORST POSSIBLE PAIN

## 2024-02-22 ASSESSMENT — COLUMBIA-SUICIDE SEVERITY RATING SCALE - C-SSRS
2. HAVE YOU ACTUALLY HAD ANY THOUGHTS OF KILLING YOURSELF?: NO
6. HAVE YOU EVER DONE ANYTHING, STARTED TO DO ANYTHING, OR PREPARED TO DO ANYTHING TO END YOUR LIFE?: NO
1. IN THE PAST MONTH, HAVE YOU WISHED YOU WERE DEAD OR WISHED YOU COULD GO TO SLEEP AND NOT WAKE UP?: NO

## 2024-02-22 ASSESSMENT — PAIN DESCRIPTION - LOCATION: LOCATION: FOOT

## 2024-02-22 ASSESSMENT — PAIN DESCRIPTION - PROGRESSION: CLINICAL_PROGRESSION: RAPIDLY IMPROVING

## 2024-02-22 NOTE — ED PROVIDER NOTES
HPI   Chief Complaint   Patient presents with    Foot Injury     Dropped humidifier on L foot this morning       68-year-old female with past medical history of osteoporosis, hypothyroidism, prediabetes, hyperlipidemia, GERD, diverticulitis presents to the emergency department for evaluation of left foot pain.  Just prior to arrival she was walking with a humidifier and dropped it over the dorsum of her left foot and has been unable to bear weight since that time secondary to pain.  No medications taken prior to arrival.  Nothing is no more weak.  Otherwise in her normal state of health.    Past medical history: As above  Past surgical history: Reviewed  Social history: No reported tobacco, alcohol, illicit drug use                          Mena Coma Scale Score: 15                     Patient History   Past Medical History:   Diagnosis Date    Acute upper respiratory infection, unspecified 02/25/2018    Viral upper respiratory tract infection    Contusion of unspecified front wall of thorax, initial encounter 06/04/2018    Rib contusion    Diverticulitis of intestine, part unspecified, without perforation or abscess without bleeding     Diverticulitis    Encounter for examination of eyes and vision without abnormal findings     Encounter for ophthalmic examination and evaluation    Encounter for gynecological examination (general) (routine) without abnormal findings     Encounter for gynecological examination without abnormal finding    Encounter for gynecological examination (general) (routine) without abnormal findings     Encounter for gynecological examination without abnormal finding    Encounter for screening for cardiovascular disorders     Encounter for screening for cardiovascular disorders    Encounter for screening mammogram for malignant neoplasm of breast     Visit for screening mammogram    Encounter for screening mammogram for malignant neoplasm of breast     Visit for screening mammogram     Encounter for screening mammogram for malignant neoplasm of breast     Visit for screening mammogram    Other conditions influencing health status 2020    History of cough    Other neuromuscular dysfunction of bladder     Hyperactivity of bladder    Other specified disorders of eustachian tube, right ear 10/19/2020    Dysfunction of right eustachian tube    Personal history of diseases of the skin and subcutaneous tissue     History of acne    Personal history of other diseases of the respiratory system     History of upper respiratory infection    Personal history of other diseases of the respiratory system     History of upper respiratory infection    Personal history of other endocrine, nutritional and metabolic disease     History of thyroid disorder    Personal history of other endocrine, nutritional and metabolic disease     History of hypothyroidism    Personal history of other endocrine, nutritional and metabolic disease     History of hyperlipidemia    Urinary tract infection, site not specified 2017    Acute urinary tract infection     Past Surgical History:   Procedure Laterality Date    APPENDECTOMY  12/10/2012    Appendectomy    CHOLECYSTECTOMY  2013    Cholecystectomy    COLONOSCOPY  12/10/2012    Complete Colonoscopy    OTHER SURGICAL HISTORY  12/10/2012    Liposuction Arms    OTHER SURGICAL HISTORY  2019    Shoulder arthroscopy    OTHER SURGICAL HISTORY  2020    Tubal ligation    OTHER SURGICAL HISTORY  2020    Endometrial ablation    OTHER SURGICAL HISTORY  2013    Shoulder Surgery Left    OTHER SURGICAL HISTORY  2022     section    OTHER SURGICAL HISTORY  2022    Dilation and evacuation    OTHER SURGICAL HISTORY  2021    Elbow surgery     Family History   Problem Relation Name Age of Onset    Other (Thyroid Disorder) Mother      Alzheimer's disease Mother      Dementia Mother      Macular degeneration Mother      Cancer Father       Heart disease Father      Hearing loss Sister      Nephrolithiasis Brother      ADD / ADHD Son      Diabetes Maternal Grandfather      Cancer Paternal Grandfather       Social History     Tobacco Use    Smoking status: Never     Passive exposure: Never    Smokeless tobacco: Never   Vaping Use    Vaping Use: Never used   Substance Use Topics    Alcohol use: Not Currently    Drug use: Never       Physical Exam   ED Triage Vitals [02/22/24 0830]   Temperature Heart Rate Respirations BP   37.1 °C (98.8 °F) 78 18 163/68      Pulse Ox Temp Source Heart Rate Source Patient Position   98 % Temporal -- --      BP Location FiO2 (%)     -- --       Physical Exam  Vitals and nursing note reviewed.   Constitutional:       General: She is not in acute distress.     Appearance: Normal appearance. She is not ill-appearing or toxic-appearing.   HENT:      Mouth/Throat:      Mouth: Mucous membranes are moist.   Eyes:      General:         Right eye: No discharge.         Left eye: No discharge.      Conjunctiva/sclera: Conjunctivae normal.   Cardiovascular:      Rate and Rhythm: Normal rate and regular rhythm.      Pulses: Normal pulses.   Pulmonary:      Effort: Pulmonary effort is normal. No respiratory distress.   Abdominal:      General: Abdomen is flat. There is no distension.      Palpations: Abdomen is soft.   Musculoskeletal:         General: Tenderness present. No swelling or deformity.      Cervical back: Normal range of motion. No rigidity.      Comments: Tenderness to palpation over the dorsum of the left foot over the distal metatarsals. No medial or lateral malleolus tenderness to palpation.  No pain over the base of the fifth metatarsal.  No tenderness over the fibular head.  Full range of motion of the ankle.  Reduced range of motion in flexion and extension of first second third fourth toe   Skin:     General: Skin is warm.      Capillary Refill: Capillary refill takes less than 2 seconds.      Coloration: Skin is  not jaundiced or pale.      Findings: No bruising.   Neurological:      General: No focal deficit present.      Mental Status: She is alert and oriented to person, place, and time.   Psychiatric:         Mood and Affect: Mood normal.         Behavior: Behavior normal.         ED Course & MDM   Diagnoses as of 02/22/24 0976   Left foot pain       Medical Decision Making  68-year-old female presented to the emergency department for evaluation of a left foot injury.  Blunt injury over the dorsum of the left foot, affected extremity is neurovascularly intact she is reduced range of motion in flexion extension of digits 1-3 for secondary to pain.  Reproducible tenderness palpation of the dorsum of the foot.  X-ray obtained evaluate for suspected metatarsal fractures.  Pain medications provided.  No fractures appreciated on x-ray, on reevaluation patient has persistent pain.  Discussed that there could be an occult fracture not visualized on x-ray and to follow-up with podiatry soon as possible/her primary care physician.  Discussed return precautions, follow-up recommendations and expectant management patient feels comfortable returning home with the boot.        Procedure  Procedures     Ariel Linn DO  02/22/24 0944

## 2024-03-04 ENCOUNTER — LAB (OUTPATIENT)
Dept: LAB | Facility: LAB | Age: 69
End: 2024-03-04
Payer: MEDICARE

## 2024-03-04 ENCOUNTER — OFFICE VISIT (OUTPATIENT)
Dept: GASTROENTEROLOGY | Facility: CLINIC | Age: 69
End: 2024-03-04
Payer: MEDICARE

## 2024-03-04 VITALS — DIASTOLIC BLOOD PRESSURE: 73 MMHG | SYSTOLIC BLOOD PRESSURE: 108 MMHG | HEART RATE: 70 BPM | TEMPERATURE: 97.2 F

## 2024-03-04 DIAGNOSIS — K76.0 FATTY LIVER: ICD-10-CM

## 2024-03-04 DIAGNOSIS — M62.830 SPASM OF MUSCLE OF LOWER BACK: ICD-10-CM

## 2024-03-04 DIAGNOSIS — K76.0 FATTY LIVER: Primary | ICD-10-CM

## 2024-03-04 PROCEDURE — 1160F RVW MEDS BY RX/DR IN RCRD: CPT | Performed by: INTERNAL MEDICINE

## 2024-03-04 PROCEDURE — 99214 OFFICE O/P EST MOD 30 MIN: CPT | Performed by: INTERNAL MEDICINE

## 2024-03-04 PROCEDURE — 1125F AMNT PAIN NOTED PAIN PRSNT: CPT | Performed by: INTERNAL MEDICINE

## 2024-03-04 PROCEDURE — 1159F MED LIST DOCD IN RCRD: CPT | Performed by: INTERNAL MEDICINE

## 2024-03-04 PROCEDURE — 1036F TOBACCO NON-USER: CPT | Performed by: INTERNAL MEDICINE

## 2024-03-04 PROCEDURE — 3008F BODY MASS INDEX DOCD: CPT | Performed by: INTERNAL MEDICINE

## 2024-03-04 PROCEDURE — 36415 COLL VENOUS BLD VENIPUNCTURE: CPT

## 2024-03-04 RX ORDER — SIMETHICONE 125 MG
TABLET,CHEWABLE ORAL EVERY 6 HOURS PRN
COMMUNITY

## 2024-03-04 RX ORDER — NIACIN 250 MG
250 TABLET ORAL
COMMUNITY

## 2024-03-04 ASSESSMENT — ENCOUNTER SYMPTOMS
FEVER: 0
ABDOMINAL DISTENTION: 0
RECTAL PAIN: 0
BLOOD IN STOOL: 0
SHORTNESS OF BREATH: 0
DIARRHEA: 0
ANAL BLEEDING: 0
CHILLS: 0
UNEXPECTED WEIGHT CHANGE: 0
VOMITING: 0
COLOR CHANGE: 0
NAUSEA: 0
ABDOMINAL PAIN: 0
TROUBLE SWALLOWING: 0
CONSTIPATION: 0

## 2024-03-04 ASSESSMENT — PAIN SCALES - GENERAL: PAINLEVEL: 10-WORST PAIN EVER

## 2024-03-04 NOTE — PATIENT INSTRUCTIONS
[phone]   (Please see Department name below when scheduling)    Please have the following done:    [x]  LABS: due date : Today ELF score (Enhanced Liver Fibrosis) Results can take 7-10 days.    [x]   FOLLOW UP  (Department Gastro): due date : Call if you do not receive your results within 1 week.     If you have any questions or need assistance, please don't hesitate to contact us.   Dr. Dunn: Office [phone] Fax: [phone]  Hepatology Nurse Coordinator: Ronel MAHER [phone]

## 2024-03-04 NOTE — ASSESSMENT & PLAN NOTE
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 If not consistent with fibrosis, no further work up will be needed  diet and exercise were encouraged  Dietary and lifestyle interventions were recommended as follows:    Perform vigorous physical activity like brisk walking or structured gym exercises 3 times a week for 30 minutes or more frequently  Avoid foods which contain complex sugars like wheat, rice, potatoes and corn.  Avoid eating foods that are rich in sugar in excess such as high sugar content fruits (pineapple, flora...) and desserts, cakes and pies  Eat more good foods that are rich in good fat such as cold water fish (salmon, swordfish...) as well as avocados and peanut butter in moderation  Snack on nuts that are high in protein and good oils such as walnuts, almonds.   Eat a mediteranean diet which is rich in grilled lean meats, high protein beans and legumes and olive oil     Exercise for 30 minutes or more at least 3 times a week  focus on exercises that build muscle mass like working with weights and lifting. Try swimming or water aerobics if you have access to a pool    Aim to lose 7-10% of your total body weight over 6-12 months

## 2024-03-04 NOTE — PROGRESS NOTES
Subjective  Her work up for causes of CLD suggests severe steatosis but at the most mild fibrosis. She is unchanged.     Review of Systems   Constitutional:  Negative for chills, fever and unexpected weight change.   HENT:  Negative for trouble swallowing.    Respiratory:  Negative for shortness of breath.    Cardiovascular:  Negative for chest pain.   Gastrointestinal:  Negative for abdominal distention, abdominal pain, anal bleeding, blood in stool, constipation, diarrhea, nausea, rectal pain and vomiting.   Skin:  Negative for color change.       Physical Exam  Vitals reviewed.   Constitutional:       General: She is awake.      Appearance: Normal appearance.   HENT:      Head: Normocephalic and atraumatic.      Nose: Nose normal.      Mouth/Throat:      Mouth: Mucous membranes are moist.   Eyes:      Pupils: Pupils are equal, round, and reactive to light.   Cardiovascular:      Rate and Rhythm: Normal rate.   Pulmonary:      Effort: Pulmonary effort is normal.   Neurological:      Mental Status: She is alert and oriented to person, place, and time. Mental status is at baseline.   Psychiatric:         Attention and Perception: Attention and perception normal.         Mood and Affect: Mood normal.         Behavior: Behavior normal.     LABS:   Lab Results   Component Value Date    ALBUMIN 4.3 01/15/2024    BILITOT 1.3 (H) 01/15/2024    BILIDIR 0.2 01/15/2024    ALKPHOS 52 01/15/2024    ALT 23 01/15/2024    AST 24 01/15/2024    PROT 6.7 01/15/2024    Q2MVNJRMJCI 113 01/15/2024    AFP 5 01/15/2024    ADITYA Negative 01/15/2024    MITOAB Positive 1:20 (A) 01/15/2024    CERULOPLSM 23.5 01/15/2024    HEPATOT Reactive (A) 01/15/2024    ASMAB Positive 1:80 (A) 01/15/2024    HEPBSAB 23.8 (H) 01/15/2024    HEPBCAB Nonreactive 01/15/2024    HEPBSAG Nonreactive 01/15/2024    HEPCAB Nonreactive 01/15/2024    INR 1.1 01/15/2024    FERRITIN 268 (H) 01/15/2024    IRON 84 01/15/2024    IRONSAT 23 (L) 01/15/2024      Lab Results    Component Value Date    AFP 5 01/15/2024      Lab Results   Component Value Date    HPIG083 <10 01/15/2024    PKNA647 <10 01/15/2024        === 10/28/23 ===    US ABDOMEN LIMITED LIVER    - Impression -  Enlarged liver with diffuse fatty infiltration. The patient is status  post cholecystectomy.    Otherwise, grossly unremarkable right upper quadrant ultrasound.    MACRO:  None    Signed by: Miguel Angel Bedolla 10/28/2023 5:34 PM  Dictation workstation:   [id number]    === 02/11/20 ===    - Impression -  1. Low-grade undersurface tearing of the far anterior supraspinatus  tendon fiber superimposed on mild tendinosis.  2. Mild tendinosis of the superior subscapularis tendon fibers  without evidence of tendon tear.  3. Mild glenohumeral articular cartilage loss.  4. Mild subacromial subdeltoid bursitis.  5. Status post acromioplasty, biceps tenotomy, and tenodesis.  6. Degenerative truncation of the anterosuperior glenoid labrum.        Fatty liver  While she certainly has MASLD, it does not appear that she has significant fibrosis to explain thrombocytopenia   We will order ELF score. If not consistent with fibrosis, no further work up will be needed  diet and exercise were encouraged  Dietary and lifestyle interventions were recommended as follows:    Perform vigorous physical activity like brisk walking or structured gym exercises 3 times a week for 30 minutes or more frequently  Avoid foods which contain complex sugars like wheat, rice, potatoes and corn.  Avoid eating foods that are rich in sugar in excess such as high sugar content fruits (pineapple, flora...) and desserts, cakes and pies  Eat more good foods that are rich in good fat such as cold water fish (salmon, swordfish...) as well as avocados and peanut butter in moderation  Snack on nuts that are high in protein and good oils such as walnuts, almonds.   Eat a mediteranean diet which is rich in grilled lean meats, high protein beans and legumes and olive  oil     Exercise for 30 minutes or more at least 3 times a week  focus on exercises that build muscle mass like working with weights and lifting. Try swimming or water aerobics if you have access to a pool    Aim to lose 7-10% of your total body weight over 6-12 months

## 2024-03-06 ENCOUNTER — OFFICE VISIT (OUTPATIENT)
Dept: PRIMARY CARE | Facility: CLINIC | Age: 69
End: 2024-03-06
Payer: MEDICARE

## 2024-03-06 DIAGNOSIS — I87.321 IDIOPATHIC CHRONIC VENOUS HYPERTENSION OF RIGHT LEG WITH INFLAMMATION: ICD-10-CM

## 2024-03-06 DIAGNOSIS — I87.322 IDIOPATHIC CHRONIC VENOUS HYPERTENSION OF LEFT LEG WITH INFLAMMATION: Primary | ICD-10-CM

## 2024-03-06 PROCEDURE — 1125F AMNT PAIN NOTED PAIN PRSNT: CPT | Performed by: INTERNAL MEDICINE

## 2024-03-06 PROCEDURE — 1036F TOBACCO NON-USER: CPT | Performed by: INTERNAL MEDICINE

## 2024-03-06 PROCEDURE — 1159F MED LIST DOCD IN RCRD: CPT | Performed by: INTERNAL MEDICINE

## 2024-03-06 PROCEDURE — 3008F BODY MASS INDEX DOCD: CPT | Performed by: INTERNAL MEDICINE

## 2024-03-06 PROCEDURE — 93970 EXTREMITY STUDY: CPT | Performed by: INTERNAL MEDICINE

## 2024-03-06 PROCEDURE — 1160F RVW MEDS BY RX/DR IN RCRD: CPT | Performed by: INTERNAL MEDICINE

## 2024-03-06 NOTE — PROGRESS NOTES
Venous Duplex      Indications:  Limb pain  Leg Edema    Sonographer: Kacey Rivera RVT    TECHNIQUE:  Venous Duplex ultrasound spectral Doppler wave modality was performed with the patient in the supine and upright positions to determine the status of the deep and superficial systems of the right and left lower extremity. The common femoral, femoral and popliteal veins of the deep venous system were imaged. The superficial system was imaged entirely to include, but was not limited to, the saphenous-femoral junction (SFJ) down the greater saphenous vein from the groin to the ankle, and the saphenous-popliteal junction (SPJ), if present, at the popliteal fossa down the small saphenous vein (SSV) to the ankle. As indicated, the cranial extensions of the SSV (CESSV), the anterior accessory GSV (AAGSV), and the posterior accessory GSV (PAGSV) were also evaluated, if present. All areas meeting the criteria for venous reflux (500 milliseconds or greater in the saphenous veins and principle branches, 350 milliseconds in perforators and 1000 milliseconds in the deep system) were confirmed with spectral Doppler analysis and confirmatory images were documented:     FINDINGS ARE THE FOLLOWING:    RIGHT LEG:  There is no evidence of thrombus in the interrogated segments of the deep or superficial venous system. There is no evidence of deep venous reflux.     GSV dimensions: 4.6mm junction  3.7mm proximal  3.0mm mid  2.5mm distal  There is <0.5 seconds of reflux of the Greater Saphenous Vein    SSV dimensions: 3.2mm junction  2.9mm mid  There is <0.5 seconds of reflux in the Small Saphenous Vein    AAGSV dimensions: 2.8mm   There is <0.5 seconds of reflux in the Anterior Accessory Saphenous Vein      LEFT LEG:  There is no evidence of thrombus in the interrogated segments of the deep or superficial venous system. There is no evidence of deep venous reflux.     GSV dimensions: 5.0mm junction  4.4mm proximal  3.7mm mid  3.1mm  distal  There is <0.5 seconds of reflux of the Greater Saphenous Vein    SSV dimensions: 4.0mm junction  2.8mm mid  There is <0.5 seconds of reflux in the Small Saphenous Vein    AAGSV dimensions: 2.5mm   There is <0.5 seconds of reflux in the Anterior Accessory Saphenous Vein      CONCLUSIONS:  There is no evidence of thrombus in the interrogated segments of the deep or superficial venous system in both legs.  No evidence of deep or superficial venous reflux.    Incidental finding of right groin oval shape hypoechogenic structure with central echogenic area measured 7.9 x 25.7 mm in diameter, possibly a lymph node identified. Clinical correlation advised.    Discussions  As per the patient's detailed venous ultrasound evaluation for symptoms are unlikely due to venous reflux or chronic venous insufficiency.  Clinical correlation advised.

## 2024-03-09 LAB — SCAN RESULT: NORMAL

## 2024-03-12 PROBLEM — M79.603 PAIN OF UPPER EXTREMITY: Status: ACTIVE | Noted: 2023-10-19

## 2024-03-12 PROBLEM — I87.309 VENOUS HYPERTENSION OF LOWER EXTREMITY: Status: ACTIVE | Noted: 2024-01-16

## 2024-03-12 PROBLEM — M79.672 LEFT FOOT PAIN: Status: ACTIVE | Noted: 2024-03-12

## 2024-03-12 PROBLEM — L81.4 LENTIGINOSIS: Status: ACTIVE | Noted: 2024-03-12

## 2024-03-12 PROBLEM — M81.0 SENILE OSTEOPOROSIS: Status: ACTIVE | Noted: 2024-01-23

## 2024-03-12 PROBLEM — R05.9 COUGH: Status: ACTIVE | Noted: 2023-10-19

## 2024-03-12 PROBLEM — M25.569 KNEE PAIN: Status: ACTIVE | Noted: 2024-01-15

## 2024-03-12 PROBLEM — N83.9 DISORDER OF OVARY: Status: ACTIVE | Noted: 2023-10-19

## 2024-03-12 PROBLEM — K76.0 STEATOSIS OF LIVER: Status: ACTIVE | Noted: 2023-10-19

## 2024-03-12 PROBLEM — D48.5 NEOPLASM OF UNCERTAIN BEHAVIOR OF SKIN: Status: ACTIVE | Noted: 2024-03-12

## 2024-03-12 PROBLEM — R93.2 ABNORMAL FINDINGS ON DIAGNOSTIC IMAGING OF LIVER AND BILIARY TRACT: Status: ACTIVE | Noted: 2023-10-24

## 2024-03-12 PROBLEM — S52.123A FRACTURE OF HEAD OF RADIUS: Status: ACTIVE | Noted: 2023-10-19

## 2024-03-12 PROBLEM — D22.9 MELANOCYTIC NEVUS: Status: ACTIVE | Noted: 2024-03-12

## 2024-03-13 ENCOUNTER — OFFICE VISIT (OUTPATIENT)
Dept: PRIMARY CARE | Facility: CLINIC | Age: 69
End: 2024-03-13
Payer: MEDICARE

## 2024-03-13 VITALS
BODY MASS INDEX: 32.11 KG/M2 | SYSTOLIC BLOOD PRESSURE: 99 MMHG | HEART RATE: 80 BPM | TEMPERATURE: 96.6 F | HEIGHT: 59 IN | DIASTOLIC BLOOD PRESSURE: 66 MMHG

## 2024-03-13 DIAGNOSIS — M79.89 LEG SWELLING: Primary | ICD-10-CM

## 2024-03-13 PROBLEM — I83.93 VARICOSE VEINS OF BOTH LOWER EXTREMITIES: Status: RESOLVED | Noted: 2024-01-16 | Resolved: 2024-03-13

## 2024-03-13 PROBLEM — I87.309 VENOUS HYPERTENSION OF LOWER EXTREMITY: Status: RESOLVED | Noted: 2024-01-16 | Resolved: 2024-03-13

## 2024-03-13 PROCEDURE — 3008F BODY MASS INDEX DOCD: CPT | Performed by: INTERNAL MEDICINE

## 2024-03-13 PROCEDURE — 1036F TOBACCO NON-USER: CPT | Performed by: INTERNAL MEDICINE

## 2024-03-13 PROCEDURE — 99213 OFFICE O/P EST LOW 20 MIN: CPT | Performed by: INTERNAL MEDICINE

## 2024-03-13 PROCEDURE — 1159F MED LIST DOCD IN RCRD: CPT | Performed by: INTERNAL MEDICINE

## 2024-03-13 PROCEDURE — 1160F RVW MEDS BY RX/DR IN RCRD: CPT | Performed by: INTERNAL MEDICINE

## 2024-03-13 NOTE — PROGRESS NOTES
Chief Complaints:  Seen for follow-up after detailed ultrasound evaluation.    HPI:  68 years old female who has had bilateral leg symptoms which will including leg swellings leg cramps has had evaluation and also had detailed ultrasound testings done came for a follow-up visit.  She has been wearing the pantyhose graduated compression stockings which she has upgraded to 30 to 40 mmHg now.  She is feeling much improved and she says her swelling is also improved.  But she does get some swellings if she does not wear the compression stockings.    She has no chest pain no short of breath no fever no chills.  She does travel somewhat frequently and she says that she does wear the knee-high stockings.  But she did not like the thigh-high because they were rolling down.  She will be wearing the pantyhose compression stockings from now.    Patient has had left leg injury and she is wearing a walking boot.  No significant swellings of the feet.    Rest of the review of systems no acute complaints.          Current Outpatient Medications on File Prior to Visit   Medication Sig Dispense Refill    alendronate (Fosamax) 70 mg tablet Take 1 tablet (70 mg) by mouth every 7 days. 1 tablet weekly on an empty stomach, remain upright for at least 30 minutes 4 tablet 11    biotin 1 mg tablet Biotin 1000 MCG Oral Tablet   Refills: 0       Active      cholecalciferol (Vitamin D-3) 50 MCG (2000 UT) tablet Take 1 tablet (2,000 Units) by mouth once daily.      diphenhydrAMINE (Benadryl Allergy) 25 mg tablet 1 tab(s) orally once a day (at bedtime), As Needed      EPINEPHrine (Auvi-Q) 0.3 mg/0.3 mL injection syringe As Directed      hydrocortisone 2.5 % ointment Apply topically 2 times a day. Up to 14 days as needed for rash/itching 30 g 3    levothyroxine (Synthroid, Levoxyl) 50 mcg tablet Take 1 tablet (50 mcg) by mouth once daily. 30 tablet 11    multivit-min/ferrous fumarate (MULTI VITAMIN ORAL) Take 1 tablet by mouth once daily.       "niacin 250 mg tablet Take 1 tablet (250 mg) by mouth once daily with breakfast.      omega 3-dha-epa-fish oil 1,000 mg (120 mg-180 mg) capsule 1 cap(s) orally once a day      simethicone (Mylicon) 125 mg chewable tablet Chew every 6 hours if needed for flatulence.      simvastatin (Zocor) 10 mg tablet Take 1 tablet (10 mg) by mouth once daily at bedtime. 90 tablet 0    ZINC ORAL Take by mouth.      levothyroxine (Synthroid, Levoxyl) 25 mcg tablet Take 1 tablet (25 mcg) by mouth once daily in the morning. Take before meals. (Patient not taking: Reported on 3/4/2024) 90 tablet 0    semaglutide 0.25 mg or 0.5 mg (2 mg/3 mL) pen injector Inject 0.25 mg under the skin 1 (one) time per week. (Patient not taking: Reported on 3/4/2024) 3 mL 0     No current facility-administered medications on file prior to visit.        Allergies   Allergen Reactions    Bee Venom Protein (Honey Bee) Anaphylaxis, Shortness of breath, Swelling and Unknown     throat swells    Feathers Shortness of breath     fertilizer    Gluten Hives and Shortness of breath    Nut - Unspecified Hives and Shortness of breath     *pine nuts    Nutritional Supplement-Fiber Hives and Anaphylaxis    Other Shortness of breath and Hives     \"Tobacco\"    Pepper (Genus Capsicum) Hives and Shortness of breath     peppers    Potato Hives and Shortness of breath    Pseudoephedrine Hcl Shortness of breath    Wheat Hives and Shortness of breath    Whey Hives and Shortness of breath     Whey    Barium Iodide Unknown    Barium Sulfate Hives    Eggplant GI Upset and Other     eggplant, severe gi upset    Fish Containing Products Hives and Nausea/vomiting     NO REACTION WITH SHELLFISH    Fish Derived Nausea/vomiting    Gadolinium-Containing Contrast Media Unknown    Grass Pollen Unknown    House Dust Mite Unknown    Iodinated Contrast Media Hives     barium, severe gi upset    Milk Containing Products (Dairy) Unknown    Mold Unknown    Oxycodone-Acetaminophen GI Upset     " "severe gi upset    Paprika Hives     paprika    Perfume Hives    Prednisone Swelling    Pregnenolone Unknown    Tomato GI Upset, Hives and Other     severe gi upset    Tree And Shrub Pollen Unknown    Tree Nuts Hives    Peanut Unknown, Hives, Itching, Rash and Swelling    Tree Nut Hives, Rash and Swelling     Pine, filbert, hazelnut, pecan        Examination:    Visit Vitals  BP 99/66   Pulse 80   Temp 35.9 °C (96.6 °F)   Ht 1.499 m (4' 11\")   BMI 32.11 kg/m²   OB Status Postmenopausal   Smoking Status Never   BSA 1.73 m²        Normal-built, well-nourished with no apparent distress. Alert oriented  Skin:  Normal turgor.  No rash.  Head:  Normocephalic, atraumatic.  Eyes:  Pupils are equal, round,.  No pallor of conjunctivae.    Neck:  Supple.  No JVD.  No clubbing  Detailed exam of the legs deferred.  Patient's left leg has a walking boot.  But she is wearing the pantyhose compression stockings in both legs.   Bilaterally 2+ dorsalis pedis pulses.  No calf tenderness. Homans sign is negative.  Neuro Exam: No focal signs. Gait is normal.        Diagnosis/ Problems  Problem List Items Addressed This Visit       Leg swelling - Primary          Plan   Patient will be followed up in 1 years time or as needed basis.    Discussions   Patient's detail ultrasound evaluation including the illustration of the refluxing superficial venous system of both legs were reviewed in detail with the patient.  The detailed ultrasound shows there is no deep venous or superficial venous abnormalities including no venous reflux identified.  We discussed the patient's symptoms and given patient does have some swellings it is beneficial to wear graduated compression stockings specially when she is traveling.  She verbalizes the understanding.    She also understands the benefits of healthy lifestyle and weight loss which she will try to achieve.    She will call me if there is any concern otherwise she will follow-up with me in 1 years " time

## 2024-04-18 ENCOUNTER — TELEPHONE (OUTPATIENT)
Dept: PRIMARY CARE | Facility: CLINIC | Age: 69
End: 2024-04-18
Payer: MEDICARE

## 2024-04-18 NOTE — TELEPHONE ENCOUNTER
Pt called and wondered if we got the fax for the PA on Ozempic.      Pt would like it faxed to her as well, so she can have it for her records. [phone]

## 2024-04-18 NOTE — TELEPHONE ENCOUNTER
T/c to pt, spoke with pt, advised pt that I do not see a request for PA for Ozempic in our system. Pt states that she saw Dr. Ghotra back in January but she never started the medication. Pt states she will fax over a request for PA that she received from her insurance company.

## 2024-04-30 ENCOUNTER — OFFICE VISIT (OUTPATIENT)
Dept: ENDOCRINOLOGY | Facility: CLINIC | Age: 69
End: 2024-04-30
Payer: MEDICARE

## 2024-04-30 VITALS — HEART RATE: 85 BPM | DIASTOLIC BLOOD PRESSURE: 56 MMHG | SYSTOLIC BLOOD PRESSURE: 107 MMHG

## 2024-04-30 DIAGNOSIS — M81.0 OSTEOPOROSIS WITHOUT CURRENT PATHOLOGICAL FRACTURE, UNSPECIFIED OSTEOPOROSIS TYPE: ICD-10-CM

## 2024-04-30 DIAGNOSIS — E03.8 OTHER SPECIFIED HYPOTHYROIDISM: Primary | ICD-10-CM

## 2024-04-30 PROCEDURE — 3008F BODY MASS INDEX DOCD: CPT | Performed by: INTERNAL MEDICINE

## 2024-04-30 PROCEDURE — 1159F MED LIST DOCD IN RCRD: CPT | Performed by: INTERNAL MEDICINE

## 2024-04-30 PROCEDURE — 99214 OFFICE O/P EST MOD 30 MIN: CPT | Performed by: INTERNAL MEDICINE

## 2024-04-30 PROCEDURE — 1160F RVW MEDS BY RX/DR IN RCRD: CPT | Performed by: INTERNAL MEDICINE

## 2024-04-30 PROCEDURE — 1036F TOBACCO NON-USER: CPT | Performed by: INTERNAL MEDICINE

## 2024-04-30 RX ORDER — LEVOTHYROXINE SODIUM 50 UG/1
50 TABLET ORAL DAILY
Qty: 90 TABLET | Refills: 3 | Status: SHIPPED | OUTPATIENT
Start: 2024-04-30 | End: 2024-05-03 | Stop reason: DRUGHIGH

## 2024-04-30 RX ORDER — ALENDRONATE SODIUM 70 MG/1
70 TABLET ORAL
Qty: 13 TABLET | Refills: 3 | Status: SHIPPED | OUTPATIENT
Start: 2024-04-30 | End: 2025-04-30

## 2024-04-30 NOTE — PROGRESS NOTES
Addendum    Will raise the dose to 75 micrograms daily; labs in 2 months       Chief Complaint    Follow up on hypothyroidism and osteoporosis  HPI  68 yr old female presents as a referral by primary care.   She is known to have Hypothyroidism, Osteopenia, Vitamin D def, preDM, Obesity Class I, Hyperlipidemia, and GERD.  She was seen earlier in January and the dose of T4 was increased to 50 micrograms/day instead of 25. Feels well except for poor sleep. It seems that she did not get the alendronate Rx yet ? Sent to the long term pharmacy.   No new concerns.     EXAM    General: Pleasant/ Kyphosis   HEENT: PERRLA, wearing  a wig - scalp baldness, nl thyroid, no nodules, +[place]   LE: +pp, no edema.   Reflexes: No delay in relaxation phase  MSK: No myopathy       Impression/ Plan   ? Euthyroid. Will get labs today and send a new Rx for the alendronate. Return in one year.       Jo Cervantes MD

## 2024-05-01 ENCOUNTER — LAB (OUTPATIENT)
Dept: LAB | Facility: LAB | Age: 69
End: 2024-05-01
Payer: COMMERCIAL

## 2024-05-01 DIAGNOSIS — E03.8 OTHER SPECIFIED HYPOTHYROIDISM: ICD-10-CM

## 2024-05-01 PROCEDURE — 84443 ASSAY THYROID STIM HORMONE: CPT

## 2024-05-01 PROCEDURE — 36415 COLL VENOUS BLD VENIPUNCTURE: CPT

## 2024-05-01 PROCEDURE — 84439 ASSAY OF FREE THYROXINE: CPT

## 2024-05-02 ENCOUNTER — TELEPHONE (OUTPATIENT)
Dept: PRIMARY CARE | Facility: CLINIC | Age: 69
End: 2024-05-02
Payer: MEDICARE

## 2024-05-02 LAB
T4 FREE SERPL-MCNC: 0.93 NG/DL (ref 0.78–1.48)
TSH SERPL-ACNC: 6.01 MIU/L (ref 0.44–3.98)

## 2024-05-02 NOTE — TELEPHONE ENCOUNTER
Patient aware and verbalized understanding.  She already had her ENDO read the results and Endo is managing.

## 2024-05-02 NOTE — TELEPHONE ENCOUNTER
----- Message from Evelin Ghotra MD sent at 5/2/2024  8:56 AM EDT -----  The last BW showed abnormal thyroid gland function. Pt could FU with me or with the endocrinology team about this BW findings.

## 2024-05-03 DIAGNOSIS — E03.9 HYPOTHYROIDISM, UNSPECIFIED TYPE: Primary | ICD-10-CM

## 2024-05-03 RX ORDER — LEVOTHYROXINE SODIUM 75 UG/1
75 TABLET ORAL
Qty: 90 TABLET | Refills: 3 | Status: SHIPPED | OUTPATIENT
Start: 2024-05-03 | End: 2025-05-03

## 2024-05-07 DIAGNOSIS — E78.49 OTHER HYPERLIPIDEMIA: ICD-10-CM

## 2024-05-10 RX ORDER — SIMVASTATIN 10 MG/1
10 TABLET, FILM COATED ORAL NIGHTLY
Qty: 90 TABLET | Refills: 0 | Status: SHIPPED | OUTPATIENT
Start: 2024-05-10

## 2024-06-13 ENCOUNTER — OFFICE VISIT (OUTPATIENT)
Dept: PRIMARY CARE | Facility: CLINIC | Age: 69
End: 2024-06-13
Payer: MEDICARE

## 2024-06-13 ENCOUNTER — HOSPITAL ENCOUNTER (OUTPATIENT)
Dept: RADIOLOGY | Facility: CLINIC | Age: 69
Discharge: HOME | End: 2024-06-13
Payer: MEDICARE

## 2024-06-13 ENCOUNTER — LAB (OUTPATIENT)
Dept: LAB | Facility: LAB | Age: 69
End: 2024-06-13
Payer: MEDICARE

## 2024-06-13 VITALS
BODY MASS INDEX: 31.75 KG/M2 | SYSTOLIC BLOOD PRESSURE: 99 MMHG | TEMPERATURE: 97 F | WEIGHT: 157.5 LBS | DIASTOLIC BLOOD PRESSURE: 66 MMHG | HEIGHT: 59 IN | HEART RATE: 84 BPM

## 2024-06-13 DIAGNOSIS — E03.9 HYPOTHYROIDISM, UNSPECIFIED TYPE: ICD-10-CM

## 2024-06-13 DIAGNOSIS — M54.2 CERVICALGIA: ICD-10-CM

## 2024-06-13 DIAGNOSIS — Z76.89 ENCOUNTER FOR WEIGHT MANAGEMENT: ICD-10-CM

## 2024-06-13 DIAGNOSIS — Z00.00 ROUTINE GENERAL MEDICAL EXAMINATION AT HEALTH CARE FACILITY: Primary | ICD-10-CM

## 2024-06-13 DIAGNOSIS — E66.9 OBESITY DUE TO ENERGY IMBALANCE: ICD-10-CM

## 2024-06-13 DIAGNOSIS — E78.5 HYPERLIPIDEMIA, UNSPECIFIED HYPERLIPIDEMIA TYPE: ICD-10-CM

## 2024-06-13 DIAGNOSIS — E66.09 CLASS 1 OBESITY DUE TO EXCESS CALORIES WITH SERIOUS COMORBIDITY AND BODY MASS INDEX (BMI) OF 31.0 TO 31.9 IN ADULT: ICD-10-CM

## 2024-06-13 DIAGNOSIS — R73.03 PRE-DIABETES: ICD-10-CM

## 2024-06-13 DIAGNOSIS — Z12.11 ENCOUNTER FOR SCREENING COLONOSCOPY: ICD-10-CM

## 2024-06-13 DIAGNOSIS — E78.49 OTHER HYPERLIPIDEMIA: ICD-10-CM

## 2024-06-13 PROBLEM — F33.9 DEPRESSION, RECURRENT (CMS-HCC): Status: ACTIVE | Noted: 2024-06-13

## 2024-06-13 PROCEDURE — 80061 LIPID PANEL: CPT

## 2024-06-13 PROCEDURE — 82043 UR ALBUMIN QUANTITATIVE: CPT

## 2024-06-13 PROCEDURE — 72040 X-RAY EXAM NECK SPINE 2-3 VW: CPT | Performed by: RADIOLOGY

## 2024-06-13 PROCEDURE — 85025 COMPLETE CBC W/AUTO DIFF WBC: CPT

## 2024-06-13 PROCEDURE — 1160F RVW MEDS BY RX/DR IN RCRD: CPT | Performed by: INTERNAL MEDICINE

## 2024-06-13 PROCEDURE — 99214 OFFICE O/P EST MOD 30 MIN: CPT | Performed by: INTERNAL MEDICINE

## 2024-06-13 PROCEDURE — 3008F BODY MASS INDEX DOCD: CPT | Performed by: INTERNAL MEDICINE

## 2024-06-13 PROCEDURE — 72040 X-RAY EXAM NECK SPINE 2-3 VW: CPT

## 2024-06-13 PROCEDURE — 1159F MED LIST DOCD IN RCRD: CPT | Performed by: INTERNAL MEDICINE

## 2024-06-13 PROCEDURE — 83036 HEMOGLOBIN GLYCOSYLATED A1C: CPT

## 2024-06-13 PROCEDURE — 1036F TOBACCO NON-USER: CPT | Performed by: INTERNAL MEDICINE

## 2024-06-13 PROCEDURE — 36415 COLL VENOUS BLD VENIPUNCTURE: CPT

## 2024-06-13 PROCEDURE — 80053 COMPREHEN METABOLIC PANEL: CPT

## 2024-06-13 PROCEDURE — 82570 ASSAY OF URINE CREATININE: CPT

## 2024-06-13 RX ORDER — SIMVASTATIN 10 MG/1
10 TABLET, FILM COATED ORAL NIGHTLY
Qty: 90 TABLET | Refills: 1 | Status: SHIPPED | OUTPATIENT
Start: 2024-06-13 | End: 2024-12-10

## 2024-06-13 RX ORDER — TIRZEPATIDE 2.5 MG/.5ML
2.5 INJECTION, SOLUTION SUBCUTANEOUS
Qty: 4 PEN | Refills: 0 | Status: SHIPPED | OUTPATIENT
Start: 2024-06-16 | End: 2024-07-16

## 2024-06-13 ASSESSMENT — PATIENT HEALTH QUESTIONNAIRE - PHQ9
SUM OF ALL RESPONSES TO PHQ9 QUESTIONS 1 AND 2: 0
1. LITTLE INTEREST OR PLEASURE IN DOING THINGS: NOT AT ALL
2. FEELING DOWN, DEPRESSED OR HOPELESS: NOT AT ALL

## 2024-06-13 NOTE — PROGRESS NOTES
Subjective   Reason for Visit: Cristy Morgan is an 68 y.o. female here for a Medicare Wellness visit.          Reviewed all medications by prescribing practitioner or clinical pharmacist (such as prescriptions, OTCs, herbal therapies and supplements) and documented in the medical record.    HPIPt is a pleasant 68 y.o F who was seen and evaluated during the medicare AWV. Pt states that she feels OK. Ot is still seeking the weight loss management options since semaglutide weekly injections were not covered by her insurance. Pt also reports the issue with the neck stiffness and recurrent headaches at the back of the head for the last couple months. Pt denies fecal/urinary incontinence/retention/weakness/numbness/tingling sensation, no saddle anesthesia reported. No hx of back trauma/mechanical fall reported. Pt also was seen at the local  with the diverticulitis issue. Pt currently takes Augmentin and reports significant improvement of the abd pain. Pt denies any N/V/D no blood in stool. During the clinical encounter pt denies fever, chills, no SOB, no chest pain/tightness, no change of urination reported. Pt denies any other health concerns during this visit.  Sohx: reviewed  List of the medications and allergies: reviewed  PMHx and Fhx: reviewed    Patient Care Team:  Evelin Ghotra MD as PCP - General (Internal Medicine)  Evelin Ghotra MD as PCP - [facility] Attributed Provider  Andrew Allen MD as Vascular (Vascular Medicine)     Review of Systems  The systems have been reviewed as follows:   Constitutional: no fever, no chills  Eyes: no eyesight problems, no eye redness, no eye pain, no blurred vision  ENT: no ear pain or sore throat, no nasal discharge or congestion, no sinus pressure, no hearing loss  Cardiovascular: no chest pain or tightness, no SOB, the heart rate was not fast or slow  Respiratory: no cough, no dyspnea with exertion or with rest, no wheezing  Gastrointestinal: no constipation  "or diarrhea, no heartburn, no nausea or vomiting, but as mentioned at HPI  Genitourinary: no urine frequency, no dysuria, no urinary urgency, no urinary incontinence or retention  Musculoskeletal: no arthralgia, no joint swelling or stiffness, no muscle weakness, but as mentioned at HPI  Integumentary: no skin lesions or rash  Neurological: no headaches, no dizziness or fainting, no limb weakness  Psychiatric: no mood changes, no anxiety or depression, no SI/HI  Endocrine: no weight change, no temperature intolerance, no   change of appetite  Hematologic/Lymphatic: no easy bruising or bleeding    Objective   Vitals:  BP 99/66 (BP Location: Left arm, Patient Position: Sitting)   Pulse 84   Temp 36.1 °C (97 °F)   Ht 1.499 m (4' 11\")   Wt 71.4 kg (157 lb 8 oz)   BMI 31.81 kg/m²       Physical Exam  Constitutional: well hydrated, well nourished, no acute distress. Vital signs reviewed  Head and face: normocephalic, atraumatic  Eyes: no erythema, edema or visible abnormalities of conjunctiva or lids. Pupils are equal, round and reactive to light. Extraocular movement normal  ENT: external ears without deformities, external ear canals without redness, swelling or tenderness. TMs normal color, normal landmarks, no fluid, non-retracted  Neck: full ROM, no adenopathy, neck was supple, thyroid gland not enlarged  Pulmonary: normal respiratory rate and effort. Lungs are clear to auscultation, no rales,no rhonchi or wheezing  Cardiovascular: RRR, normal S1 and S2, no murmur, no gallop, posterior tib. pulse normal 2+ bilaterally, no lower extremity edema  Abdomen: soft, non tender on palpation, not distended, normal BS in all 4 quadrants, no CVA tenderness  Musculoskeletal: no joint swelling, normal movements of all 4 extremities. Gait and station: normal, Digits and nails: normal without clubbing  Skin: normal color, no rash  Neurologic: Cranial nerves: CN II: visual acuity intact. Source: acuity reported by patient. " Pupils reactive to light with normal accommodation. Right and left pupil normal CN III, IV and VI: the EO movements were intact. CN VIII: no hearing loss. Deep tendon reflexes: were 2+ and symmetric: R and L patella.  Sensory exam: normal to light touch  Psychiatric: Mood and affect: normal, Alert and Oriented x 3  Lymphatic: no cervical lymphadenopathy  Assessment/Plan   Problem List Items Addressed This Visit    None  AWV, medicare:  HX and PE as mentioned  Obesity screening: BMI of 31  MDD screening: declined  Pt states that she is UTD with the age appropriate vaccination at the local pharmacy, no immunization record is available.   PT is UTD with the screening mammogram  PT is due for the screening colonoscopy and the referral was provided  PT is UTD with the DEXA screening test  AWV in 1 year    Cervicalgia:  Hx and PE as mentioned  Will order the cervical spine XR  Pt was instructed to contact PCP if pt will have no improvement of the condition/worsening of the sxs/new sxs    HLD: will refill simvastatin 10 mg PO daily. Will check lipid panel    Weight management encounter:  Hx and PE as mentioned BMI consistent with obesity WHO class I with co morbidities  Will order the Tirzepatide 2.5 mg subcutaneous weekly x 4 weeks  Pre diabetes; Will check HGBa1C and U alb, CBC, CMP  Hypothyroidism: well controlled on the current dose of the medication    I discussed every sxs with the pt in detail. Pt verbalized understanding of the health  condition and agreed with the clinical approach as discussed as mentioned above  Please FU with PCP as planned or sooner if needed.

## 2024-06-14 ENCOUNTER — TELEPHONE (OUTPATIENT)
Dept: PRIMARY CARE | Facility: CLINIC | Age: 69
End: 2024-06-14
Payer: MEDICARE

## 2024-06-14 LAB
ALBUMIN SERPL BCP-MCNC: 4.3 G/DL (ref 3.4–5)
ALP SERPL-CCNC: 53 U/L (ref 33–136)
ALT SERPL W P-5'-P-CCNC: 14 U/L (ref 7–45)
ANION GAP SERPL CALC-SCNC: 16 MMOL/L (ref 10–20)
AST SERPL W P-5'-P-CCNC: 15 U/L (ref 9–39)
BASOPHILS # BLD AUTO: 0.04 X10*3/UL (ref 0–0.1)
BASOPHILS NFR BLD AUTO: 0.4 %
BILIRUB SERPL-MCNC: 1.4 MG/DL (ref 0–1.2)
BUN SERPL-MCNC: 13 MG/DL (ref 6–23)
CALCIUM SERPL-MCNC: 9.7 MG/DL (ref 8.6–10.6)
CHLORIDE SERPL-SCNC: 102 MMOL/L (ref 98–107)
CHOLEST SERPL-MCNC: 159 MG/DL (ref 0–199)
CHOLESTEROL/HDL RATIO: 3
CO2 SERPL-SCNC: 28 MMOL/L (ref 21–32)
CREAT SERPL-MCNC: 0.88 MG/DL (ref 0.5–1.05)
CREAT UR-MCNC: 217 MG/DL (ref 20–320)
EGFRCR SERPLBLD CKD-EPI 2021: 73 ML/MIN/1.73M*2
EOSINOPHIL # BLD AUTO: 0.16 X10*3/UL (ref 0–0.7)
EOSINOPHIL NFR BLD AUTO: 1.8 %
ERYTHROCYTE [DISTWIDTH] IN BLOOD BY AUTOMATED COUNT: 12.6 % (ref 11.5–14.5)
EST. AVERAGE GLUCOSE BLD GHB EST-MCNC: 117 MG/DL
GLUCOSE SERPL-MCNC: 96 MG/DL (ref 74–99)
HBA1C MFR BLD: 5.7 %
HCT VFR BLD AUTO: 44.4 % (ref 36–46)
HDLC SERPL-MCNC: 52.9 MG/DL
HGB BLD-MCNC: 14.5 G/DL (ref 12–16)
IMM GRANULOCYTES # BLD AUTO: 0.06 X10*3/UL (ref 0–0.7)
IMM GRANULOCYTES NFR BLD AUTO: 0.7 % (ref 0–0.9)
LDLC SERPL CALC-MCNC: 82 MG/DL
LYMPHOCYTES # BLD AUTO: 2.67 X10*3/UL (ref 1.2–4.8)
LYMPHOCYTES NFR BLD AUTO: 29.4 %
MCH RBC QN AUTO: 31.7 PG (ref 26–34)
MCHC RBC AUTO-ENTMCNC: 32.7 G/DL (ref 32–36)
MCV RBC AUTO: 97 FL (ref 80–100)
MICROALBUMIN UR-MCNC: 21.8 MG/L
MICROALBUMIN/CREAT UR: 10 UG/MG CREAT
MONOCYTES # BLD AUTO: 0.77 X10*3/UL (ref 0.1–1)
MONOCYTES NFR BLD AUTO: 8.5 %
NEUTROPHILS # BLD AUTO: 5.37 X10*3/UL (ref 1.2–7.7)
NEUTROPHILS NFR BLD AUTO: 59.2 %
NON HDL CHOLESTEROL: 106 MG/DL (ref 0–149)
NRBC BLD-RTO: 0 /100 WBCS (ref 0–0)
PLATELET # BLD AUTO: 183 X10*3/UL (ref 150–450)
POTASSIUM SERPL-SCNC: 3.6 MMOL/L (ref 3.5–5.3)
PROT SERPL-MCNC: 7.1 G/DL (ref 6.4–8.2)
RBC # BLD AUTO: 4.57 X10*6/UL (ref 4–5.2)
SODIUM SERPL-SCNC: 142 MMOL/L (ref 136–145)
TRIGL SERPL-MCNC: 119 MG/DL (ref 0–149)
VLDL: 24 MG/DL (ref 0–40)
WBC # BLD AUTO: 9.1 X10*3/UL (ref 4.4–11.3)

## 2024-06-14 NOTE — TELEPHONE ENCOUNTER
Please, inform the pt that the recent BW showed stable state of pre diabetes. The bilirubin level (part of the liver panel) is minimally elevated and stable. The rest of the BW WNL.   Please, ask the pt to provide the immunization list. Pt states that she had all vaccination done at the local pharmacy, but no record available in the chart.   Thank you

## 2024-06-17 DIAGNOSIS — M54.2 CERVICALGIA: Primary | ICD-10-CM

## 2024-06-20 ENCOUNTER — TELEPHONE (OUTPATIENT)
Dept: CARDIOLOGY | Facility: CLINIC | Age: 69
End: 2024-06-20
Payer: MEDICARE

## 2024-11-05 ENCOUNTER — TELEPHONE (OUTPATIENT)
Dept: CARDIOLOGY | Facility: CLINIC | Age: 69
End: 2024-11-05
Payer: MEDICARE

## 2024-11-05 DIAGNOSIS — E78.49 OTHER HYPERLIPIDEMIA: ICD-10-CM

## 2024-11-05 RX ORDER — SIMVASTATIN 10 MG/1
10 TABLET, FILM COATED ORAL NIGHTLY
Qty: 90 TABLET | Refills: 0 | Status: SHIPPED | OUTPATIENT
Start: 2024-11-05 | End: 2025-02-03

## 2024-11-05 RX ORDER — SIMVASTATIN 10 MG/1
10 TABLET, FILM COATED ORAL NIGHTLY
Qty: 14 TABLET | Refills: 0 | Status: SHIPPED | OUTPATIENT
Start: 2024-11-05 | End: 2024-11-19

## 2024-11-05 NOTE — TELEPHONE ENCOUNTER
Patient requesting 2 week supply of Simvastatin sent to [facility] in [place] and 90 days supply with 1 refill sent to [facility]

## 2024-12-24 ENCOUNTER — TELEPHONE (OUTPATIENT)
Dept: PRIMARY CARE | Facility: CLINIC | Age: 69
End: 2024-12-24
Payer: MEDICARE

## 2024-12-24 NOTE — TELEPHONE ENCOUNTER
Rx Refill Request Telephone Encounter    Name:  Cristy Morgan  :  [id number]  Medication Name:  doxycycline  [facility]  Date of last appointment:    Date of next appointment:  none  Best number to reach patient:

## 2024-12-30 ENCOUNTER — APPOINTMENT (OUTPATIENT)
Dept: PRIMARY CARE | Facility: CLINIC | Age: 69
End: 2024-12-30
Payer: MEDICARE

## 2025-01-02 ENCOUNTER — APPOINTMENT (OUTPATIENT)
Dept: PRIMARY CARE | Facility: CLINIC | Age: 70
End: 2025-01-02
Payer: MEDICARE

## 2025-01-08 ENCOUNTER — APPOINTMENT (OUTPATIENT)
Dept: PRIMARY CARE | Facility: CLINIC | Age: 70
End: 2025-01-08
Payer: MEDICARE

## 2025-01-08 VITALS
DIASTOLIC BLOOD PRESSURE: 78 MMHG | HEART RATE: 69 BPM | BODY MASS INDEX: 33.37 KG/M2 | WEIGHT: 159 LBS | SYSTOLIC BLOOD PRESSURE: 123 MMHG | HEIGHT: 58 IN

## 2025-01-08 DIAGNOSIS — E66.09 CLASS 1 OBESITY DUE TO EXCESS CALORIES WITH SERIOUS COMORBIDITY AND BODY MASS INDEX (BMI) OF 33.0 TO 33.9 IN ADULT: Primary | ICD-10-CM

## 2025-01-08 DIAGNOSIS — R73.9 HYPERGLYCEMIA: ICD-10-CM

## 2025-01-08 DIAGNOSIS — E66.811 CLASS 1 OBESITY DUE TO EXCESS CALORIES WITH SERIOUS COMORBIDITY AND BODY MASS INDEX (BMI) OF 33.0 TO 33.9 IN ADULT: Primary | ICD-10-CM

## 2025-01-08 DIAGNOSIS — E03.9 HYPOTHYROIDISM, UNSPECIFIED TYPE: ICD-10-CM

## 2025-01-08 DIAGNOSIS — E78.2 MIXED HYPERLIPIDEMIA: ICD-10-CM

## 2025-01-08 DIAGNOSIS — R73.03 PRE-DIABETES: ICD-10-CM

## 2025-01-08 PROBLEM — D69.6 DECREASED PLATELET COUNT (CMS-HCC): Status: RESOLVED | Noted: 2023-10-30 | Resolved: 2025-01-08

## 2025-01-08 PROBLEM — E11.9 TYPE 2 DIABETES MELLITUS WITHOUT COMPLICATION, WITHOUT LONG-TERM CURRENT USE OF INSULIN (MULTI): Status: ACTIVE | Noted: 2025-01-08

## 2025-01-08 PROCEDURE — 1159F MED LIST DOCD IN RCRD: CPT | Performed by: INTERNAL MEDICINE

## 2025-01-08 PROCEDURE — 1036F TOBACCO NON-USER: CPT | Performed by: INTERNAL MEDICINE

## 2025-01-08 PROCEDURE — 3008F BODY MASS INDEX DOCD: CPT | Performed by: INTERNAL MEDICINE

## 2025-01-08 PROCEDURE — 99214 OFFICE O/P EST MOD 30 MIN: CPT | Performed by: INTERNAL MEDICINE

## 2025-01-08 PROCEDURE — G2211 COMPLEX E/M VISIT ADD ON: HCPCS | Performed by: INTERNAL MEDICINE

## 2025-01-08 PROCEDURE — 1160F RVW MEDS BY RX/DR IN RCRD: CPT | Performed by: INTERNAL MEDICINE

## 2025-01-08 ASSESSMENT — LIFESTYLE VARIABLES
HOW OFTEN DO YOU HAVE SIX OR MORE DRINKS ON ONE OCCASION: NEVER
HAVE YOU OR SOMEONE ELSE BEEN INJURED AS A RESULT OF YOUR DRINKING: NO
SKIP TO QUESTIONS 9-10: 1
AUDIT TOTAL SCORE: 0
AUDIT-C TOTAL SCORE: 0
HOW MANY STANDARD DRINKS CONTAINING ALCOHOL DO YOU HAVE ON A TYPICAL DAY: PATIENT DOES NOT DRINK
HOW OFTEN DO YOU HAVE A DRINK CONTAINING ALCOHOL: NEVER
HAS A RELATIVE, FRIEND, DOCTOR, OR ANOTHER HEALTH PROFESSIONAL EXPRESSED CONCERN ABOUT YOUR DRINKING OR SUGGESTED YOU CUT DOWN: NO

## 2025-01-08 ASSESSMENT — PATIENT HEALTH QUESTIONNAIRE - PHQ9
SUM OF ALL RESPONSES TO PHQ9 QUESTIONS 1 AND 2: 0
2. FEELING DOWN, DEPRESSED OR HOPELESS: NOT AT ALL
1. LITTLE INTEREST OR PLEASURE IN DOING THINGS: NOT AT ALL

## 2025-01-08 NOTE — PROGRESS NOTES
Assessment/Plan   69 years old female who has had history of hypothyroidism hyperlipidemia and prediabetes came for a follow-up visit.    We reviewed and discussed the importance of lifestyle modifications.    Patient has obesity and patient will do the lifestyle modifications.  We had a lengthy discussion about the medications available and she will be started on semaglutide.  Side effects were discussed.  Patient will be followed up in 1 months to have a reevaluation.  She will have the blood test as ordered prior to her next visit.    Patient has had slightly elevated TSH and we discussed about the medications and she will take the medication empty stomach early morning with a glass of water.    If there is any other concern she will see me or call me sooner.      Problem List Items Addressed This Visit       Hyperglycemia    Relevant Medications    semaglutide 0.25 mg or 0.5 mg (2 mg/3 mL) pen injector    Hyperlipidemia    Relevant Orders    CBC and Auto Differential    Comprehensive Metabolic Panel    Lipid Panel    Hypothyroidism    Relevant Orders    TSH with reflex to Free T4 if abnormal    Hemoglobin A1C    Class 1 obesity due to excess calories with serious comorbidity and body mass index (BMI) of 33.0 to 33.9 in adult - Primary    Relevant Medications    semaglutide 0.25 mg or 0.5 mg (2 mg/3 mL) pen injector    Pre-diabetes    Relevant Medications    semaglutide 0.25 mg or 0.5 mg (2 mg/3 mL) pen injector    Other Relevant Orders    Hemoglobin A1C       Subjective     Patient ID: Cristy Morgan is a 69 y.o. female who presents for requesting ozempic.    History of present illness  69 years old female who is a  came to review her general problems and get the treatment for her obesity.    Patient has had history of bilateral chronic venous insufficiency and successfully treated and feeling good.    Patient wants to lose weight and she has been trying as much as possible and previously she has tried  to get the medications which are not covered.  She has had history of elevated blood sugars and the she remembers her hemoglobin A1c was 7 about 3 to 5 years ago but the records are not able to be retrieved.  Her recent hemoglobin A1c was 5.7 with a diet and exercise.    Patient was taking care of her mother who passed away about a week ago and she is waiting to have the  within a week.  She is emotional at this time but she says she is not depressed or suicidal.    As a  she is planning to go back to work again.    She takes the thyroid medication regularly early morning empty stomach.    She denies any urine bowel symptoms.  She has no chest pain no short of breath.    There is no family history of thyroid cancer or indicate you have multiple endocrine neoplasia.    Rest of the review of systems without any acute complaints.    Family History   Problem Relation Name Age of Onset    Other (Thyroid Disorder) Mother      Alzheimer's disease Mother      Dementia Mother      Macular degeneration Mother      Seizures Mother      Solid Tumor Mother      Cancer Father      Heart disease Father      Hearing loss Sister      Nephrolithiasis Brother      ADD / ADHD Son      Diabetes Maternal Grandfather      Cancer Paternal Grandfather        Social History     Socioeconomic History    Marital status:      Spouse name: Not on file    Number of children: Not on file    Years of education: Not on file    Highest education level: Not on file   Occupational History    Not on file   Tobacco Use    Smoking status: Never     Passive exposure: Never    Smokeless tobacco: Never   Vaping Use    Vaping status: Never Used   Substance and Sexual Activity    Alcohol use: Never    Drug use: Never    Sexual activity: Not on file   Other Topics Concern    Not on file   Social History Narrative    Not on file     Social Drivers of Health     Financial Resource Strain: Not on file   Food Insecurity: Not on file    Transportation Needs: Not on file   Physical Activity: Not on file   Stress: Not on file   Social Connections: Not on file   Intimate Partner Violence: Not on file   Housing Stability: Not on file      Bee venom protein (honey bee), Feathers, Gluten, Nut - unspecified, Nutritional supplement-fiber, Other, Pepper (genus capsicum), Potato, Pseudoephedrine hcl, Wheat, Whey, Barium iodide, Barium sulfate, Eggplant, Fish containing products, Fish derived, Gadolinium-containing contrast media, Grass pollen, House dust mite, Iodinated contrast media, Milk containing products (dairy), Mold, Oxycodone-acetaminophen, Paprika, Perfume, Phenylephrine, Prednisone, Pregnenolone, Tomato, Tree and shrub pollen, Tree nuts, Peanut, and Tree nut   Current Outpatient Medications   Medication Sig Dispense Refill    alendronate (Fosamax) 70 mg tablet Take 1 tablet (70 mg) by mouth every 7 days. 1 tablet weekly on an empty stomach, remain upright for at least 30 minutes 13 tablet 3    biotin 1 mg tablet Biotin 1000 MCG Oral Tablet   Refills: 0       Active      cholecalciferol (Vitamin D-3) 50 MCG (2000 UT) tablet Take 1 tablet (2,000 Units) by mouth once daily.      diphenhydrAMINE (Benadryl Allergy) 25 mg tablet 1 tab(s) orally once a day (at bedtime), As Needed      levothyroxine (Synthroid, Levoxyl) 75 mcg tablet Take 1 tablet (75 mcg) by mouth once daily in the morning. Take before meals. 90 tablet 3    multivit-min/ferrous fumarate (MULTI VITAMIN ORAL) Take 1 tablet by mouth once daily.      niacin 250 mg tablet Take 1 tablet (250 mg) by mouth once daily with breakfast.      omega 3-dha-epa-fish oil 1,000 mg (120 mg-180 mg) capsule 1 cap(s) orally once a day      simethicone (Mylicon) 125 mg chewable tablet Chew every 6 hours if needed for flatulence.      simvastatin (Zocor) 10 mg tablet Take 1 tablet (10 mg) by mouth once daily at bedtime. 90 tablet 0    ZINC ORAL Take by mouth.      hydrocortisone 2.5 % ointment Apply topically  2 times a day. Up to 14 days as needed for rash/itching (Patient not taking: Reported on 1/8/2025) 30 g 3    semaglutide 0.25 mg or 0.5 mg (2 mg/3 mL) pen injector Inject 0.25 mg under the skin 1 (one) time per week. 3 mL 1    simvastatin (Zocor) 10 mg tablet Take 1 tablet (10 mg) by mouth once daily at bedtime for 14 days. 14 tablet 0     No current facility-administered medications for this visit.        Objective     Vitals:    01/08/25 1612   BP: 123/78   Pulse: 69        Physical Exam     Normal-built, well-nourished  with no apparent distress. Alert oriented  At time tearful because of her mother's passing away about a week ago.  Skin:  Normal turgor.  No rash.  Head:  Normocephalic, atraumatic.  Eyes:  Pupils are equal, round,.  No pallor of conjunctivae.  Mouth has moist oral mucosa.  Neck:  Supple.  No JVD.  No carotid bruit.  No thyromegaly. No cervical lymphadenopathy.  No clubbing  Chest:  Vesicular breathing Bilaterally good air entry and bilaterally clear to auscultation.  No wheezing.  No crackles.  Heart:  Regular rate and rhythm.  S1, S2 positive.  No murmur.  Abdomen:  Soft and nontender.  Bowel sounds are positive.  No organomegaly.  Extremities: No significant swelling but the detailed exam is deferred.  No calf tenderness. Homans sign is negative.  Neuro Exam: No focal signs. Gait is normal.    Problem List Items Addressed This Visit       Hyperglycemia    Relevant Medications    semaglutide 0.25 mg or 0.5 mg (2 mg/3 mL) pen injector    Hyperlipidemia    Relevant Orders    CBC and Auto Differential    Comprehensive Metabolic Panel    Lipid Panel    Hypothyroidism    Relevant Orders    TSH with reflex to Free T4 if abnormal    Hemoglobin A1C    Class 1 obesity due to excess calories with serious comorbidity and body mass index (BMI) of 33.0 to 33.9 in adult - Primary    Relevant Medications    semaglutide 0.25 mg or 0.5 mg (2 mg/3 mL) pen injector    Pre-diabetes    Relevant Medications     semaglutide 0.25 mg or 0.5 mg (2 mg/3 mL) pen injector    Other Relevant Orders    Hemoglobin A1C        Orders Placed This Encounter   Procedures    CBC and Auto Differential     Standing Status:   Future     Standing Expiration Date:   1/8/2026     Order Specific Question:   Release result to MyChart     Answer:   Immediate    Comprehensive Metabolic Panel     Standing Status:   Future     Standing Expiration Date:   5/8/2025     Order Specific Question:   Release result to MyChart     Answer:   Immediate    TSH with reflex to Free T4 if abnormal     Standing Status:   Future     Standing Expiration Date:   5/8/2025     Order Specific Question:   Release result to MyChart     Answer:   Immediate    Hemoglobin A1C     Standing Status:   Future     Standing Expiration Date:   5/8/2025     Order Specific Question:   Release result to MyChart     Answer:   Immediate    Lipid Panel     fasting     Standing Status:   Future     Standing Expiration Date:   7/8/2025     Order Specific Question:   Release result to MyChart     Answer:   Immediate [1]        Lab Results   Component Value Date    WBC 9.1 06/13/2024    HGB 14.5 06/13/2024    HCT 44.4 06/13/2024     06/13/2024    CHOL 159 06/13/2024    TRIG 119 06/13/2024    HDL 52.9 06/13/2024    ALT 14 06/13/2024    AST 15 06/13/2024     06/13/2024    K 3.6 06/13/2024     06/13/2024    CREATININE 0.88 06/13/2024    BUN 13 06/13/2024    CO2 28 06/13/2024    TSH 6.01 (H) 05/01/2024    INR 1.1 01/15/2024    HGBA1C 5.7 (H) 06/13/2024     Lab Results   Component Value Date    CHOL 159 06/13/2024    LDLCALC 82 06/13/2024    CHHDL 3.0 06/13/2024       No results found.     Patient was identified as a fall risk. Risk prevention instructions provided.

## 2025-01-16 DIAGNOSIS — E78.49 OTHER HYPERLIPIDEMIA: ICD-10-CM

## 2025-01-17 RX ORDER — SIMVASTATIN 10 MG/1
10 TABLET, FILM COATED ORAL NIGHTLY
Qty: 90 TABLET | Refills: 1 | Status: SHIPPED | OUTPATIENT
Start: 2025-01-17

## 2025-01-24 ENCOUNTER — APPOINTMENT (OUTPATIENT)
Dept: DERMATOLOGY | Facility: CLINIC | Age: 70
End: 2025-01-24
Payer: MEDICARE

## 2025-01-24 ENCOUNTER — TELEPHONE (OUTPATIENT)
Dept: PRIMARY CARE | Facility: CLINIC | Age: 70
End: 2025-01-24

## 2025-01-24 DIAGNOSIS — E66.9 OBESITY (BMI 30-39.9): ICD-10-CM

## 2025-01-24 DIAGNOSIS — R73.03 PRE-DIABETES: Primary | ICD-10-CM

## 2025-01-24 RX ORDER — TIRZEPATIDE 2.5 MG/.5ML
2.5 INJECTION, SOLUTION SUBCUTANEOUS WEEKLY
Qty: 4 PEN | Refills: 3 | Status: SHIPPED | OUTPATIENT
Start: 2025-01-24

## 2025-01-24 NOTE — TELEPHONE ENCOUNTER
Pt lvm stating her insurance advised that mounjaro should be covered if that is prescribed instead of ozempic.

## 2025-02-05 ENCOUNTER — OFFICE VISIT (OUTPATIENT)
Dept: PRIMARY CARE | Facility: CLINIC | Age: 70
End: 2025-02-05
Payer: MEDICARE

## 2025-02-05 VITALS
BODY MASS INDEX: 33.58 KG/M2 | HEIGHT: 58 IN | SYSTOLIC BLOOD PRESSURE: 103 MMHG | WEIGHT: 160 LBS | DIASTOLIC BLOOD PRESSURE: 69 MMHG | HEART RATE: 117 BPM

## 2025-02-05 DIAGNOSIS — H65.01 NON-RECURRENT ACUTE SEROUS OTITIS MEDIA OF RIGHT EAR: Primary | ICD-10-CM

## 2025-02-05 PROCEDURE — 3008F BODY MASS INDEX DOCD: CPT | Performed by: INTERNAL MEDICINE

## 2025-02-05 PROCEDURE — 99213 OFFICE O/P EST LOW 20 MIN: CPT | Performed by: INTERNAL MEDICINE

## 2025-02-05 PROCEDURE — 1159F MED LIST DOCD IN RCRD: CPT | Performed by: INTERNAL MEDICINE

## 2025-02-05 PROCEDURE — 1036F TOBACCO NON-USER: CPT | Performed by: INTERNAL MEDICINE

## 2025-02-05 RX ORDER — CIPROFLOXACIN 500 MG/1
500 TABLET ORAL 2 TIMES DAILY
Qty: 20 TABLET | Refills: 0 | Status: SHIPPED | OUTPATIENT
Start: 2025-02-05 | End: 2025-02-15

## 2025-02-05 RX ORDER — OFLOXACIN 3 MG/ML
5 SOLUTION AURICULAR (OTIC) 2 TIMES DAILY
Qty: 5 ML | Refills: 0 | Status: SHIPPED | OUTPATIENT
Start: 2025-02-05 | End: 2025-02-15

## 2025-02-05 ASSESSMENT — LIFESTYLE VARIABLES
HOW OFTEN DO YOU HAVE SIX OR MORE DRINKS ON ONE OCCASION: NEVER
AUDIT-C TOTAL SCORE: 0
HOW OFTEN DO YOU HAVE A DRINK CONTAINING ALCOHOL: NEVER
AUDIT TOTAL SCORE: 0
HOW MANY STANDARD DRINKS CONTAINING ALCOHOL DO YOU HAVE ON A TYPICAL DAY: PATIENT DOES NOT DRINK
SKIP TO QUESTIONS 9-10: 1
HAVE YOU OR SOMEONE ELSE BEEN INJURED AS A RESULT OF YOUR DRINKING: NO
HAS A RELATIVE, FRIEND, DOCTOR, OR ANOTHER HEALTH PROFESSIONAL EXPRESSED CONCERN ABOUT YOUR DRINKING OR SUGGESTED YOU CUT DOWN: NO

## 2025-02-05 ASSESSMENT — PATIENT HEALTH QUESTIONNAIRE - PHQ9
1. LITTLE INTEREST OR PLEASURE IN DOING THINGS: NOT AT ALL
2. FEELING DOWN, DEPRESSED OR HOPELESS: NOT AT ALL
SUM OF ALL RESPONSES TO PHQ9 QUESTIONS 1 AND 2: 0

## 2025-02-05 NOTE — PROGRESS NOTES
Assessment/Plan   69 years old female with acute right ear pain and exam shows she has right otitis media.  She will be treated with p.o. Cipro and also ofloxacin.  Side effects of the medications were reviewed and discussed.  Patient has fluticasone nasal spray which she will use it.    Patient will keep the appointment with the audiologist and also the ENT.    She will follow-up with me as already scheduled in about 2 weeks for her other general care.      Problem List Items Addressed This Visit       Non-recurrent acute serous otitis media of right ear - Primary    Relevant Medications    ciprofloxacin (Cipro) 500 mg tablet    ofloxacin (Floxin) 0.3 % otic solution       Subjective     Patient ID: Cristy Morgan is a 69 y.o. female who presents for Earache (right).    History of present illness  69 years old female who is a  by profession came for an acute visit because of her right ear pain.    She went to swimming pool and she says when she came back she started to have pain in the right ear and it is feel like throbbing.  She has had previous ear infections and the last one was about a year ago she feels like this is like a worst infection she ever had.  She had decreased hearing in the right ear and she is waiting to see a ENT on 3/3/2025.    She has no fever no chills.  She has no nausea vomiting.  She denies any throat pain.    Her medications are reviewed.    No chest pain, no short of breath and no dizziness.  No leg swellings.        Family History   Problem Relation Name Age of Onset    Other (Thyroid Disorder) Mother      Alzheimer's disease Mother      Dementia Mother      Macular degeneration Mother      Seizures Mother      Solid Tumor Mother      Cancer Father      Heart disease Father      Hearing loss Sister      Nephrolithiasis Brother      ADD / ADHD Son      Diabetes Maternal Grandfather      Cancer Paternal Grandfather        Social History     Socioeconomic History    Marital  status:      Spouse name: Not on file    Number of children: Not on file    Years of education: Not on file    Highest education level: Not on file   Occupational History    Not on file   Tobacco Use    Smoking status: Never     Passive exposure: Never    Smokeless tobacco: Never   Vaping Use    Vaping status: Never Used   Substance and Sexual Activity    Alcohol use: Never    Drug use: Never    Sexual activity: Not on file   Other Topics Concern    Not on file   Social History Narrative    Not on file     Social Drivers of Health     Financial Resource Strain: Not on file   Food Insecurity: Not on file   Transportation Needs: Not on file   Physical Activity: Not on file   Stress: Not on file   Social Connections: Not on file   Intimate Partner Violence: Not on file   Housing Stability: Not on file      Bee venom protein (honey bee), Feathers, Gluten, Nut - unspecified, Nutritional supplement-fiber, Other, Pepper (genus capsicum), Potato, Pseudoephedrine hcl, Wheat, Whey, Barium iodide, Barium sulfate, Eggplant, Fish containing products, Fish derived, Gadolinium-containing contrast media, Grass pollen, House dust mite, Iodinated contrast media, Milk containing products (dairy), Mold, Oxycodone-acetaminophen, Paprika, Perfume, Phenylephrine, Prednisone, Pregnenolone, Tomato, Tree and shrub pollen, Tree nuts, Peanut, and Tree nut   Current Outpatient Medications   Medication Sig Dispense Refill    alendronate (Fosamax) 70 mg tablet Take 1 tablet (70 mg) by mouth every 7 days. 1 tablet weekly on an empty stomach, remain upright for at least 30 minutes 13 tablet 3    biotin 1 mg tablet Biotin 1000 MCG Oral Tablet   Refills: 0       Active      cholecalciferol (Vitamin D-3) 50 MCG (2000 UT) tablet Take 1 tablet (2,000 Units) by mouth once daily.      diphenhydrAMINE (Benadryl Allergy) 25 mg tablet 1 tab(s) orally once a day (at bedtime), As Needed      levothyroxine (Synthroid, Levoxyl) 75 mcg tablet Take 1 tablet  (75 mcg) by mouth once daily in the morning. Take before meals. 90 tablet 3    multivit-min/ferrous fumarate (MULTI VITAMIN ORAL) Take 1 tablet by mouth once daily.      omega 3-dha-epa-fish oil 1,000 mg (120 mg-180 mg) capsule 1 cap(s) orally once a day      simethicone (Mylicon) 125 mg chewable tablet Chew every 6 hours if needed for flatulence.      simvastatin (Zocor) 10 mg tablet TAKE 1 TABLET DAILY AT BEDTIME 90 tablet 1    TIRZEPATIDE, WEIGHT LOSS, SUBQ Inject under the skin. 3mg daily      ZINC ORAL Take by mouth.      ciprofloxacin (Cipro) 500 mg tablet Take 1 tablet (500 mg) by mouth 2 times a day for 10 days. 20 tablet 0    hydrocortisone 2.5 % ointment Apply topically 2 times a day. Up to 14 days as needed for rash/itching (Patient not taking: Reported on 2/5/2025) 30 g 3    niacin 250 mg tablet Take 1 tablet (250 mg) by mouth once daily with breakfast. (Patient not taking: Reported on 2/5/2025)      ofloxacin (Floxin) 0.3 % otic solution Administer 5 drops into the right ear 2 times a day for 10 days. 5 mL 0    simvastatin (Zocor) 10 mg tablet Take 1 tablet (10 mg) by mouth once daily at bedtime for 14 days. 14 tablet 0     No current facility-administered medications for this visit.       Objective     Vitals:    02/05/25 1337   BP: 103/69   Pulse: (!) 117        Physical Exam  Obese, alert, oriented x3, not in distress.  Not pale, no cyanosis, no icterus. No skin rash  Neck:  Supple.  No JVD. No thyromegaly, trachea in the midline  No clubbing.  Throat is not inflamed and no tonsillar exudates. No lymphadenopathy.  Left ear is normal.  Right ear external canal has inflammation with tympanic membrane is red.  No significant exudates.  Respiratory System:  Vesicular breathing moderate air entry and breath sounds.  No wheezing and no rales. No pleural rub.  Cardiovascular:  S1 and S2 positive.  No murmur.  Regular rate and rhythm.  Extremities:  Peripheral pulses present.  No edema. Gait is  normal        Problem List Items Addressed This Visit       Non-recurrent acute serous otitis media of right ear - Primary    Relevant Medications    ciprofloxacin (Cipro) 500 mg tablet    ofloxacin (Floxin) 0.3 % otic solution        No orders of the defined types were placed in this encounter.       Lab Results   Component Value Date    WBC 9.1 06/13/2024    HGB 14.5 06/13/2024    HCT 44.4 06/13/2024     06/13/2024    CHOL 159 06/13/2024    TRIG 119 06/13/2024    HDL 52.9 06/13/2024    ALT 14 06/13/2024    AST 15 06/13/2024     06/13/2024    K 3.6 06/13/2024     06/13/2024    CREATININE 0.88 06/13/2024    BUN 13 06/13/2024    CO2 28 06/13/2024    TSH 6.01 (H) 05/01/2024    INR 1.1 01/15/2024    HGBA1C 5.7 (H) 06/13/2024     Lab Results   Component Value Date    CHOL 159 06/13/2024    LDLCALC 82 06/13/2024    CHHDL 3.0 06/13/2024       No results found.

## 2025-02-18 ENCOUNTER — APPOINTMENT (OUTPATIENT)
Dept: PRIMARY CARE | Facility: CLINIC | Age: 70
End: 2025-02-18
Payer: MEDICARE

## 2025-02-18 VITALS
HEIGHT: 58 IN | BODY MASS INDEX: 33.44 KG/M2 | DIASTOLIC BLOOD PRESSURE: 59 MMHG | SYSTOLIC BLOOD PRESSURE: 114 MMHG | HEART RATE: 71 BPM

## 2025-02-18 DIAGNOSIS — N39.3 STRESS INCONTINENCE IN FEMALE: ICD-10-CM

## 2025-02-18 DIAGNOSIS — E66.811 CLASS 1 OBESITY DUE TO EXCESS CALORIES WITH SERIOUS COMORBIDITY AND BODY MASS INDEX (BMI) OF 33.0 TO 33.9 IN ADULT: ICD-10-CM

## 2025-02-18 DIAGNOSIS — R73.9 HYPERGLYCEMIA: ICD-10-CM

## 2025-02-18 DIAGNOSIS — B37.31 VAGINAL CANDIDIASIS: Primary | ICD-10-CM

## 2025-02-18 DIAGNOSIS — E03.9 HYPOTHYROIDISM, UNSPECIFIED TYPE: ICD-10-CM

## 2025-02-18 DIAGNOSIS — K76.0 STEATOSIS OF LIVER: ICD-10-CM

## 2025-02-18 DIAGNOSIS — H61.21 EXCESSIVE EAR WAX, RIGHT: ICD-10-CM

## 2025-02-18 DIAGNOSIS — E66.09 CLASS 1 OBESITY DUE TO EXCESS CALORIES WITH SERIOUS COMORBIDITY AND BODY MASS INDEX (BMI) OF 33.0 TO 33.9 IN ADULT: ICD-10-CM

## 2025-02-18 LAB
ALBUMIN SERPL-MCNC: 4.5 G/DL (ref 3.6–5.1)
ALP SERPL-CCNC: 36 U/L (ref 37–153)
ALT SERPL-CCNC: 19 U/L (ref 6–29)
ANION GAP SERPL CALCULATED.4IONS-SCNC: 9 MMOL/L (CALC) (ref 7–17)
AST SERPL-CCNC: 21 U/L (ref 10–35)
BASOPHILS # BLD AUTO: 42 CELLS/UL (ref 0–200)
BASOPHILS NFR BLD AUTO: 0.8 %
BILIRUB SERPL-MCNC: 1.1 MG/DL (ref 0.2–1.2)
BUN SERPL-MCNC: 10 MG/DL (ref 7–25)
CALCIUM SERPL-MCNC: 9.1 MG/DL (ref 8.6–10.4)
CHLORIDE SERPL-SCNC: 105 MMOL/L (ref 98–110)
CHOLEST SERPL-MCNC: 171 MG/DL
CHOLEST/HDLC SERPL: 3.6 (CALC)
CO2 SERPL-SCNC: 27 MMOL/L (ref 20–32)
CREAT SERPL-MCNC: 0.66 MG/DL (ref 0.5–1.05)
EGFRCR SERPLBLD CKD-EPI 2021: 96 ML/MIN/1.73M2
EOSINOPHIL # BLD AUTO: 140 CELLS/UL (ref 15–500)
EOSINOPHIL NFR BLD AUTO: 2.7 %
ERYTHROCYTE [DISTWIDTH] IN BLOOD BY AUTOMATED COUNT: 12.2 % (ref 11–15)
EST. AVERAGE GLUCOSE BLD GHB EST-MCNC: 126 MG/DL
EST. AVERAGE GLUCOSE BLD GHB EST-SCNC: 7 MMOL/L
GLUCOSE SERPL-MCNC: 98 MG/DL (ref 65–99)
HBA1C MFR BLD: 6 % OF TOTAL HGB
HCT VFR BLD AUTO: 45.8 % (ref 35–45)
HDLC SERPL-MCNC: 47 MG/DL
HGB BLD-MCNC: 15.7 G/DL (ref 11.7–15.5)
LDLC SERPL CALC-MCNC: 93 MG/DL (CALC)
LYMPHOCYTES # BLD AUTO: 2200 CELLS/UL (ref 850–3900)
LYMPHOCYTES NFR BLD AUTO: 42.3 %
MCH RBC QN AUTO: 32 PG (ref 27–33)
MCHC RBC AUTO-ENTMCNC: 34.3 G/DL (ref 32–36)
MCV RBC AUTO: 93.3 FL (ref 80–100)
MONOCYTES # BLD AUTO: 468 CELLS/UL (ref 200–950)
MONOCYTES NFR BLD AUTO: 9 %
NEUTROPHILS # BLD AUTO: 2350 CELLS/UL (ref 1500–7800)
NEUTROPHILS NFR BLD AUTO: 45.2 %
NONHDLC SERPL-MCNC: 124 MG/DL (CALC)
PLATELET # BLD AUTO: 146 THOUSAND/UL (ref 140–400)
PMV BLD REES-ECKER: 11 FL (ref 7.5–12.5)
POTASSIUM SERPL-SCNC: 3.6 MMOL/L (ref 3.5–5.3)
PROT SERPL-MCNC: 7.1 G/DL (ref 6.1–8.1)
RBC # BLD AUTO: 4.91 MILLION/UL (ref 3.8–5.1)
SODIUM SERPL-SCNC: 141 MMOL/L (ref 135–146)
TRIGL SERPL-MCNC: 212 MG/DL
TSH SERPL-ACNC: 1.79 MIU/L (ref 0.4–4.5)
WBC # BLD AUTO: 5.2 THOUSAND/UL (ref 3.8–10.8)

## 2025-02-18 PROCEDURE — 1159F MED LIST DOCD IN RCRD: CPT | Performed by: INTERNAL MEDICINE

## 2025-02-18 PROCEDURE — 1036F TOBACCO NON-USER: CPT | Performed by: INTERNAL MEDICINE

## 2025-02-18 PROCEDURE — 99214 OFFICE O/P EST MOD 30 MIN: CPT | Performed by: INTERNAL MEDICINE

## 2025-02-18 PROCEDURE — G2211 COMPLEX E/M VISIT ADD ON: HCPCS | Performed by: INTERNAL MEDICINE

## 2025-02-18 PROCEDURE — 1160F RVW MEDS BY RX/DR IN RCRD: CPT | Performed by: INTERNAL MEDICINE

## 2025-02-18 RX ORDER — MV-MN/C/THEANINE/HERB NO.310 1000-200MG
POWDER IN PACKET (EA) ORAL
COMMUNITY

## 2025-02-18 RX ORDER — VIT C/HESPERIDIN/BIOFLAVONOIDS 500-100 MG
1 TABLET ORAL DAILY
COMMUNITY

## 2025-02-18 RX ORDER — GINKGO BILOBA LEAF EXTRACT 60 MG
CAPSULE ORAL
COMMUNITY

## 2025-02-18 RX ORDER — FLUCONAZOLE 150 MG/1
150 TABLET ORAL ONCE
Qty: 1 TABLET | Refills: 0 | Status: SHIPPED | OUTPATIENT
Start: 2025-02-18 | End: 2025-02-18

## 2025-02-18 RX ORDER — ASPIRIN 81 MG
5 TABLET, DELAYED RELEASE (ENTERIC COATED) ORAL 2 TIMES DAILY
Qty: 15 ML | Refills: 0 | Status: SHIPPED | OUTPATIENT
Start: 2025-02-18 | End: 2025-02-28

## 2025-02-18 ASSESSMENT — LIFESTYLE VARIABLES
HOW MANY STANDARD DRINKS CONTAINING ALCOHOL DO YOU HAVE ON A TYPICAL DAY: PATIENT DOES NOT DRINK
HOW OFTEN DO YOU HAVE A DRINK CONTAINING ALCOHOL: NEVER
HOW OFTEN DO YOU HAVE SIX OR MORE DRINKS ON ONE OCCASION: NEVER
HAS A RELATIVE, FRIEND, DOCTOR, OR ANOTHER HEALTH PROFESSIONAL EXPRESSED CONCERN ABOUT YOUR DRINKING OR SUGGESTED YOU CUT DOWN: NO
HAVE YOU OR SOMEONE ELSE BEEN INJURED AS A RESULT OF YOUR DRINKING: NO
SKIP TO QUESTIONS 9-10: 1
AUDIT-C TOTAL SCORE: 0
AUDIT TOTAL SCORE: 0

## 2025-02-18 ASSESSMENT — PATIENT HEALTH QUESTIONNAIRE - PHQ9
2. FEELING DOWN, DEPRESSED OR HOPELESS: NOT AT ALL
SUM OF ALL RESPONSES TO PHQ9 QUESTIONS 1 AND 2: 0
1. LITTLE INTEREST OR PLEASURE IN DOING THINGS: NOT AT ALL

## 2025-02-18 NOTE — PROGRESS NOTES
Assessment/Plan   69 years old female who is a  and independent active with the practice at this time came for a follow-up visit.  She has symptoms of vaginal candidiasis and a detailed exam is deferred and she will be treated with fluconazole.  Side effects and interactions were reviewed and discussed and she will hold the simvastatin for 1 week.    Patient has symptoms of stress incontinence.  Discussed about the pelvic exercises and she will also see a urogynecologist.    Her ear exams shows no evidence of infection and she has some excessive wax for which she will use the Debrox.  She has a appointment with the audiology and also ENT which she keeps it.    Her fatty liver and steatosis was discussed in she understands importance of lifestyle modification and weight loss.    Her chronic problem hypothyroidism and hyper glycemia are stable.  She will follow-up with me as scheduled in 3 months.      Problem List Items Addressed This Visit       Hyperglycemia    Hypothyroidism    Class 1 obesity due to excess calories with serious comorbidity and body mass index (BMI) of 33.0 to 33.9 in adult    Vaginal candidiasis - Primary    Relevant Medications    fluconazole (Diflucan) 150 mg tablet    Steatosis of liver    Stress incontinence in female    Relevant Orders    Referral to Urogynecology    Excessive ear wax, right    Relevant Medications    carbamide peroxide (Debrox) 6.5 % otic solution       Subjective     Patient ID: Cristy Morgan is a 69 y.o. female who presents for Follow-up.    History of present illness  69 years old female who is a  busy at work came for a follow-up visit.    She was on antibiotics and she says that she has symptoms of vaginal candidiasis with some itching and whitish discharge.  She does not want to use the vaginal suppositories and she would like to have the oral medication.    Her ears are feeling better but she feels there is some blockage in the ears and wants it to be  checked.    She is trying to lose weight and she is tolerating the medications well.  She is trying to do the lifestyle modifications.    She is complaining of stress incontinence of urine.  She has been doing the pelvic exercises from the Internet but she says has no improvement.  She has not seen a GYN for 3 years.    She is known to have fatty liver and she is trying to lose weight.    She denies any chest pain or short of breath.  Rest of the review of systems no acute complaints.    Family History   Problem Relation Name Age of Onset    Other (Thyroid Disorder) Mother      Alzheimer's disease Mother      Dementia Mother      Macular degeneration Mother      Seizures Mother      Solid Tumor Mother      Cancer Father      Heart disease Father      Hearing loss Sister      Nephrolithiasis Brother      ADD / ADHD Son      Diabetes Maternal Grandfather      Cancer Paternal Grandfather        Social History     Socioeconomic History    Marital status:      Spouse name: Not on file    Number of children: Not on file    Years of education: Not on file    Highest education level: Not on file   Occupational History    Not on file   Tobacco Use    Smoking status: Never     Passive exposure: Never    Smokeless tobacco: Never   Vaping Use    Vaping status: Never Used   Substance and Sexual Activity    Alcohol use: Never    Drug use: Never    Sexual activity: Not on file   Other Topics Concern    Not on file   Social History Narrative    Not on file     Social Drivers of Health     Financial Resource Strain: Not on file   Food Insecurity: Not on file   Transportation Needs: Not on file   Physical Activity: Not on file   Stress: Not on file   Social Connections: Not on file   Intimate Partner Violence: Not on file   Housing Stability: Not on file      Bee venom protein (honey bee), Feathers, Gluten, Nut - unspecified, Nutritional supplement-fiber, Other, Pepper (genus capsicum), Potato, Pseudoephedrine hcl, Wheat,  Whey, Barium iodide, Barium sulfate, Eggplant, Fish containing products, Fish derived, Gadolinium-containing contrast media, Grass pollen, House dust mite, Iodinated contrast media, Milk containing products (dairy), Mold, Oxycodone-acetaminophen, Paprika, Perfume, Phenylephrine, Prednisone, Pregnenolone, Tomato, Tree and shrub pollen, Tree nuts, Peanut, and Tree nut   Current Outpatient Medications   Medication Sig Dispense Refill    alendronate (Fosamax) 70 mg tablet Take 1 tablet (70 mg) by mouth every 7 days. 1 tablet weekly on an empty stomach, remain upright for at least 30 minutes 13 tablet 3    calcium amino acid chelate 200 mg calcium tablet Take 1 tablet (200 mg) by mouth once daily.      cholecalciferol (Vitamin D-3) 50 MCG (2000 UT) tablet Take 1 tablet (2,000 Units) by mouth once daily.      levothyroxine (Synthroid, Levoxyl) 75 mcg tablet Take 1 tablet (75 mcg) by mouth once daily in the morning. Take before meals. 90 tablet 3    multivit-min/ferrous fumarate (MULTI VITAMIN ORAL) Take 1 tablet by mouth once daily.      omega 3-dha-epa-fish oil 1,000 mg (120 mg-180 mg) capsule 1 cap(s) orally once a day      simethicone (Mylicon) 125 mg chewable tablet Chew every 6 hours if needed for flatulence.      simvastatin (Zocor) 10 mg tablet TAKE 1 TABLET DAILY AT BEDTIME 90 tablet 1    TIRZEPATIDE, WEIGHT LOSS, SUBQ Inject under the skin. 3mg daily      ZINC ORAL Take by mouth.      B6-folic-B12-coffee-phosphatid (Neuriva Plus Brain Performance) 1.7 mg-400 mcg- 2.4 mcg capsule Take by mouth. 1 capsule daily      carbamide peroxide (Debrox) 6.5 % otic solution Administer 5 drops into each ear 2 times a day for 10 days. 15 mL 0    diphenhydrAMINE (Benadryl Allergy) 25 mg tablet 1 tab(s) orally once a day (at bedtime), As Needed (Patient not taking: Reported on 2/18/2025)      fluconazole (Diflucan) 150 mg tablet Take 1 tablet (150 mg) by mouth 1 time for 1 dose. 1 tablet 0    ginkgo biloba leaf extract 60 mg  capsule Take by mouth. 1 capsule daily      hydrocortisone 2.5 % ointment Apply topically 2 times a day. Up to 14 days as needed for rash/itching (Patient not taking: Reported on 1/8/2025) 30 g 3     No current facility-administered medications for this visit.        Objective     Vitals:    02/18/25 0851   BP: 114/59   Pulse: 71        Physical Exam   Normal-built, well-nourished  with no apparent distress. Alert oriented  Skin:  Normal turgor.  No rash.  Head:  Normocephalic, atraumatic.  Eyes:  Pupils are equal, round,.  Right ear has wax present.  No evidence of inflammation in both ears.  No pallor of conjunctivae.  Mouth has moist oral mucosa.  Neck:  Supple.  No JVD.  No carotid bruit.  No thyromegaly. No cervical lymphadenopathy.  No clubbing  Chest:  Vesicular breathing Bilaterally good air entry and bilaterally clear to auscultation.  No wheezing.  No crackles.  Heart:  Regular rate and rhythm.  S1, S2 positive.  No murmur.  Abdomen:  Soft and nontender.  Bowel sounds are positive.  No organomegaly.  Extremities: No significant swelling but the detailed exam is deferred.  No calf tenderness. Homans sign is negative.  Neuro Exam: No focal signs. Gait is normal.      Problem List Items Addressed This Visit       Hyperglycemia    Hypothyroidism    Class 1 obesity due to excess calories with serious comorbidity and body mass index (BMI) of 33.0 to 33.9 in adult    Vaginal candidiasis - Primary    Relevant Medications    fluconazole (Diflucan) 150 mg tablet    Steatosis of liver    Stress incontinence in female    Relevant Orders    Referral to Urogynecology    Excessive ear wax, right    Relevant Medications    carbamide peroxide (Debrox) 6.5 % otic solution        Orders Placed This Encounter   Procedures    Referral to Urogynecology     Standing Status:   Future     Standing Expiration Date:   2/18/2026     Referral Priority:   Routine     Referral Type:   Consultation     Referral Reason:   Specialty  Services Required     Number of Visits Requested:   1        Lab Results   Component Value Date    WBC 5.2 02/17/2025    HGB 15.7 (H) 02/17/2025    HCT 45.8 (H) 02/17/2025     02/17/2025    CHOL 171 02/17/2025    TRIG 212 (H) 02/17/2025    HDL 47 (L) 02/17/2025    ALT 19 02/17/2025    AST 21 02/17/2025     02/17/2025    K 3.6 02/17/2025     02/17/2025    CREATININE 0.66 02/17/2025    BUN 10 02/17/2025    CO2 27 02/17/2025    TSH 1.79 02/17/2025    INR 1.1 01/15/2024    HGBA1C 6.0 (H) 02/17/2025     Lab Results   Component Value Date    CHOL 171 02/17/2025    LDLCALC 93 02/17/2025    CHHDL 3.6 02/17/2025       No results found.              Patient was identified as a fall risk. Risk prevention instructions provided.

## 2025-03-03 ENCOUNTER — APPOINTMENT (OUTPATIENT)
Facility: CLINIC | Age: 70
End: 2025-03-03
Payer: MEDICARE

## 2025-03-03 ENCOUNTER — CLINICAL SUPPORT (OUTPATIENT)
Dept: AUDIOLOGY | Facility: CLINIC | Age: 70
End: 2025-03-03
Payer: MEDICARE

## 2025-03-03 VITALS
TEMPERATURE: 97.4 F | BODY MASS INDEX: 33.58 KG/M2 | DIASTOLIC BLOOD PRESSURE: 76 MMHG | HEIGHT: 58 IN | WEIGHT: 160 LBS | SYSTOLIC BLOOD PRESSURE: 112 MMHG

## 2025-03-03 DIAGNOSIS — Z86.69 HISTORY OF OTITIS EXTERNA: ICD-10-CM

## 2025-03-03 DIAGNOSIS — H90.3 SENSORINEURAL HEARING LOSS (SNHL) OF BOTH EARS: Primary | ICD-10-CM

## 2025-03-03 DIAGNOSIS — H92.01 RIGHT EAR PAIN: Primary | ICD-10-CM

## 2025-03-03 DIAGNOSIS — H61.21 IMPACTED CERUMEN OF RIGHT EAR: ICD-10-CM

## 2025-03-03 PROCEDURE — 99204 OFFICE O/P NEW MOD 45 MIN: CPT | Performed by: OTOLARYNGOLOGY

## 2025-03-03 PROCEDURE — 1160F RVW MEDS BY RX/DR IN RCRD: CPT | Performed by: OTOLARYNGOLOGY

## 2025-03-03 PROCEDURE — 92557 COMPREHENSIVE HEARING TEST: CPT | Performed by: AUDIOLOGIST

## 2025-03-03 PROCEDURE — 92550 TYMPANOMETRY & REFLEX THRESH: CPT | Mod: 52 | Performed by: AUDIOLOGIST

## 2025-03-03 PROCEDURE — 69210 REMOVE IMPACTED EAR WAX UNI: CPT | Performed by: OTOLARYNGOLOGY

## 2025-03-03 PROCEDURE — 1159F MED LIST DOCD IN RCRD: CPT | Performed by: OTOLARYNGOLOGY

## 2025-03-03 PROCEDURE — 1036F TOBACCO NON-USER: CPT | Performed by: OTOLARYNGOLOGY

## 2025-03-03 PROCEDURE — 3008F BODY MASS INDEX DOCD: CPT | Performed by: OTOLARYNGOLOGY

## 2025-03-03 NOTE — PROGRESS NOTES
Impression:  1. Right ear pain        2. Impacted cerumen of right ear        3. History of otitis externa             RECOMMENDATIONS/PLAN :  Using the operative microscope and suction I was able to remove her impacted cerumen from that right ear and she was feeling better.  I reassured her there is no evidence of any residual inflammation of the external ear canal.  She will continue to keep all water out of that right ear and she will avoid Q-tips.  She can otherwise follow-up as needed.      **This electronic medical record note was created with the use of voice recognition software.  Despite proofreading, typographical or grammatical errors may be present that could affect meaning of content **    Subjective   Patient ID:     Cristy Morgan is a 69 y.o. female who presents to the office today after she recently developed swelling along her right external canal.  She was treated with oral antibiotics as well as Floxin eardrops.  She is feeling much better now.  She denies any further drainage fever or chills.  No problems in the left ear.    ROS:  A detailed 12 system review of systems is noted on the intake form has been reviewed with the patient with details noted in the HPI and scanned into the patient's medical record.    Objective     Past Medical History:   Diagnosis Date    Acute upper respiratory infection, unspecified 02/25/2018    Viral upper respiratory tract infection    Contusion of unspecified front wall of thorax, initial encounter 06/04/2018    Rib contusion    Decreased platelet count (CMS-HCC) 10/30/2023    Diverticulitis of intestine, part unspecified, without perforation or abscess without bleeding     Diverticulitis    Encounter for examination of eyes and vision without abnormal findings     Encounter for ophthalmic examination and evaluation    Encounter for gynecological examination (general) (routine) without abnormal findings     Encounter for gynecological examination without  abnormal finding    Encounter for gynecological examination (general) (routine) without abnormal findings     Encounter for gynecological examination without abnormal finding    Encounter for screening for cardiovascular disorders     Encounter for screening for cardiovascular disorders    Encounter for screening mammogram for malignant neoplasm of breast     Visit for screening mammogram    Encounter for screening mammogram for malignant neoplasm of breast     Visit for screening mammogram    Encounter for screening mammogram for malignant neoplasm of breast     Visit for screening mammogram    Idiopathic chronic venous hypertension of right leg with inflammation 01/16/2024    Other conditions influencing health status 01/05/2020    History of cough    Other neuromuscular dysfunction of bladder     Hyperactivity of bladder    Other specified disorders of eustachian tube, right ear 10/19/2020    Dysfunction of right eustachian tube    Personal history of diseases of the skin and subcutaneous tissue     History of acne    Personal history of other diseases of the respiratory system     History of upper respiratory infection    Personal history of other diseases of the respiratory system     History of upper respiratory infection    Personal history of other endocrine, nutritional and metabolic disease     History of thyroid disorder    Personal history of other endocrine, nutritional and metabolic disease     History of hypothyroidism    Personal history of other endocrine, nutritional and metabolic disease     History of hyperlipidemia    Urinary tract infection, site not specified 08/19/2017    Acute urinary tract infection        Past Surgical History:   Procedure Laterality Date    APPENDECTOMY  12/10/2012    Appendectomy    CHOLECYSTECTOMY  08/09/2013    Cholecystectomy    COLONOSCOPY  12/10/2012    Complete Colonoscopy    OTHER SURGICAL HISTORY  12/10/2012    Liposuction Arms    OTHER SURGICAL HISTORY   "2019    Shoulder arthroscopy    OTHER SURGICAL HISTORY  2020    Tubal ligation    OTHER SURGICAL HISTORY  2020    Endometrial ablation    OTHER SURGICAL HISTORY  2013    Shoulder Surgery Left    OTHER SURGICAL HISTORY  2022     section    OTHER SURGICAL HISTORY  2022    Dilation and evacuation    OTHER SURGICAL HISTORY  2021    Elbow surgery        Allergies   Allergen Reactions    Bee Venom Protein (Honey Bee) Anaphylaxis, Shortness of breath, Swelling and Unknown     throat swells    Feathers Shortness of breath     fertilizer    Gluten Hives and Shortness of breath    Nut - Unspecified Hives and Shortness of breath     *pine nuts    Nutritional Supplement-Fiber Hives and Anaphylaxis    Other Shortness of breath and Hives     \"Tobacco\"    Pepper (Genus Capsicum) Hives and Shortness of breath     peppers    Potato Hives and Shortness of breath    Pseudoephedrine Hcl Shortness of breath    Wheat Hives and Shortness of breath    Whey Hives and Shortness of breath     Whey    Barium Iodide Unknown    Barium Sulfate Hives    Eggplant GI Upset and Other     eggplant, severe gi upset    Fish Containing Products Hives and Nausea/vomiting     NO REACTION WITH SHELLFISH    Fish Derived Nausea/vomiting    Gadolinium-Containing Contrast Media Unknown    Grass Pollen Unknown    House Dust Mite Unknown    Iodinated Contrast Media Hives     barium, severe gi upset    Milk Containing Products (Dairy) Unknown    Mold Unknown    Oxycodone-Acetaminophen GI Upset     severe gi upset    Paprika Hives     paprika    Perfume Hives    Phenylephrine Unknown    Prednisone Swelling    Pregnenolone Unknown    Tomato GI Upset, Hives and Other     severe gi upset    Tree And Shrub Pollen Unknown    Tree Nuts Hives    Peanut Unknown, Hives, Itching, Rash and Swelling    Tree Nut Hives, Rash and Swelling     Pine, filquyen, hazelnut, alfred          Current Outpatient Medications:     alendronate " (Fosamax) 70 mg tablet, Take 1 tablet (70 mg) by mouth every 7 days. 1 tablet weekly on an empty stomach, remain upright for at least 30 minutes, Disp: 13 tablet, Rfl: 3    B6-folic-B12-coffee-phosphatid (Neuriva Plus Brain Performance) 1.7 mg-400 mcg- 2.4 mcg capsule, Take by mouth. 1 capsule daily, Disp: , Rfl:     calcium amino acid chelate 200 mg calcium tablet, Take 1 tablet (200 mg) by mouth once daily., Disp: , Rfl:     cholecalciferol (Vitamin D-3) 50 MCG (2000 UT) tablet, Take 1 tablet (2,000 Units) by mouth once daily., Disp: , Rfl:     ginkgo biloba leaf extract 60 mg capsule, Take by mouth. 1 capsule daily, Disp: , Rfl:     levothyroxine (Synthroid, Levoxyl) 75 mcg tablet, Take 1 tablet (75 mcg) by mouth once daily in the morning. Take before meals., Disp: 90 tablet, Rfl: 3    multivit-min/ferrous fumarate (MULTI VITAMIN ORAL), Take 1 tablet by mouth once daily., Disp: , Rfl:     omega 3-dha-epa-fish oil 1,000 mg (120 mg-180 mg) capsule, 1 cap(s) orally once a day, Disp: , Rfl:     simethicone (Mylicon) 125 mg chewable tablet, Chew every 6 hours if needed for flatulence., Disp: , Rfl:     simvastatin (Zocor) 10 mg tablet, TAKE 1 TABLET DAILY AT BEDTIME, Disp: 90 tablet, Rfl: 1    TIRZEPATIDE, WEIGHT LOSS, SUBQ, Inject under the skin. 3mg daily, Disp: , Rfl:     ZINC ORAL, Take by mouth., Disp: , Rfl:     diphenhydrAMINE (Benadryl Allergy) 25 mg tablet, 1 tab(s) orally once a day (at bedtime), As Needed (Patient not taking: Reported on 2/18/2025), Disp: , Rfl:     hydrocortisone 2.5 % ointment, Apply topically 2 times a day. Up to 14 days as needed for rash/itching (Patient not taking: Reported on 1/8/2025), Disp: 30 g, Rfl: 3     Tobacco Use: Low Risk  (3/3/2025)    Patient History     Smoking Tobacco Use: Never     Smokeless Tobacco Use: Never     Passive Exposure: Never        Alcohol Use: Not At Risk (2/18/2025)    AUDIT-C     Frequency of Alcohol Consumption: Never     Average Number of Drinks:  "Patient does not drink     Frequency of Binge Drinking: Never        Social History     Substance and Sexual Activity   Drug Use Never        Physical Exam:  Visit Vitals  /76   Temp 36.3 °C (97.4 °F) (Temporal)   Ht 1.473 m (4' 10\")   Wt 72.6 kg (160 lb)   BMI 33.44 kg/m²   OB Status Postmenopausal   Smoking Status Never   BSA 1.72 m²      General: Patient is alert, oriented, cooperative in no apparent distress.  Head: Normocephalic, atraumatic.  Eyes: PERRL, EOMI, Conjunctiva is clear. No nystagmus.  Ears: Right Ear-- Pinna is normal.  External auditory canal is occluded with cerumen.  Using the operative microscope and curette I was able to remove this and she was feeling better.  No further evidence of inflammation along the external canal.. Tympanic membrane is [intact, translucent and has good mobility with my pneumatic otoscope. No effusion].  Mastoid is nontender.  Left ear-- Pinna is normal.  External auditory canal is patent. Tympanic membrane is [intact, translucent and has good mobility with my pneumatic otoscope.  No effusion].  Mastoid is nontender.  Nose: Septum is straight.  No septal perforation or lesions. No septal hematoma/ seroma.  No signs of bleeding.  Inferior turbinates are normal.   No evidence of intranasal polyps.  No infectious drainage.  Throat:  Floor of mouth is clear, no masses.  Tongue appears normal, no lesions or masses. Gums, gingiva, buccal mucosa appear pink and moist, no lesions. Teeth are in good repair.  No obvious dental infections.  Peritonsillar regions appear symmetric without swelling.  Hard and soft palate appear normal, no obvious cleft. Uvula is midline.  Oropharynx: No lesions. Retropharyngeal wall is flat.  No active postnasal drip.  Neck: Supple,  no lymphadenopathy.  No masses.  Salivary Glands: Symmetric bilaterally.  No palpable masses.  No evidence of acute infection or salivary stones  Neurologic: Cranial Nerves 2-12 are grossly intact without focal " deficits. Cerebellar function testing is normal.     Results:   I reviewed her previous audiogram and she does have a mild high-frequency sensorineural loss and this is slightly worse in the right ear.  Tympanic membrane's have normal mobility on tympanometry.  Word recognition scores are 100% bilaterally.  Speech reception threshold is 25 dB in the right ear and 15 dB in the left ear.    Procedure:   []    Chavez Srinivasan, DO

## 2025-03-04 NOTE — PROGRESS NOTES
Name: Cristy Morgan  YOB: 1955  Age: 69 y.o.    Date of Evaluation:  03/03/2025   Cristy Morgan is seen today at the request of Andrew Allen MD and Chavez Srinivasan DO for an evaluation of hearing.  Cristy reports a history of right sided otitis media and pain with decreased hearing. She denies tinnitus, vertigo, and history of noise exposure.     Evaluation:  Otoscopy revealed mild wax (right) and clear canal (left) with visible tympanic membranes bilaterally.    Immittance:  Right: Type A middle ear function with normal compliance, peak pressure, and ear canal volume.  Left: Type A middle ear function with normal compliance, peak pressure, and ear canal volume.    Ipsilateral Acoustic Reflexes:  Right: present 500-2000 Hz. CNT 4000 Hz due to pain.  Left: present 500-4000 Hz    Behavioral Audiometry:  Right: normal hearing 125-1000 Hz sloping to moderate sensorineural hearing loss 0666-1816 Hz. Excellent word understanding (100 %) at 65 dB HL.  Left:  normal hearing 125-6000 sloping to mild hearing loss at 8000 Hz. Excellent word understanding (100 %) at 55 dB HL.    Pure tone averages in agreement with speech reception thresholds.    Results:  Today's results were discussed with the patient indicating normal hearing sloping to a moderate sensorineural hearing loss in the right ear and normal hearing sloping to a mild sensorineural hearing loss in the left ear. Normal tympanograms and excellent word understanding bilaterally.    Treatment Plan:  Follow-up with referring provider  Retest hearing in conjunction with medical management or if a change in hearing occurs  Consider binaural amplification if hearing hinders daily activities    Time: 4001-3336    Completed by:  Miesha De Los Santos, CCC-A  Licensed Senior Audiologist

## 2025-03-10 DIAGNOSIS — M81.0 OSTEOPOROSIS WITHOUT CURRENT PATHOLOGICAL FRACTURE, UNSPECIFIED OSTEOPOROSIS TYPE: ICD-10-CM

## 2025-05-08 ENCOUNTER — TELEPHONE (OUTPATIENT)
Dept: PRIMARY CARE | Facility: CLINIC | Age: 70
End: 2025-05-08
Payer: MEDICARE

## 2025-05-08 NOTE — TELEPHONE ENCOUNTER
Rx Refill Request Telephone Encounter    Name:  Cristy Morgan  :  [id number]  Medication Name:    alendronate (Fosamax) 70 mg tablet     Specific Pharmacy location:    [facility] [address] [place] [id number] [facility] Phone: [phone]   Fax: [phone]        Date of last appointment:  25    Date of next appointment:  2025    Valtrex Counseling: I discussed with the patient the risks of valacyclovir including but not limited to kidney damage, nausea, vomiting and severe allergy.  The patient understands that if the infection seems to be worsening or is not improving, they are to call.

## 2025-05-13 ENCOUNTER — APPOINTMENT (OUTPATIENT)
Dept: PRIMARY CARE | Facility: CLINIC | Age: 70
End: 2025-05-13
Payer: MEDICARE

## 2025-05-13 VITALS
HEART RATE: 86 BPM | SYSTOLIC BLOOD PRESSURE: 103 MMHG | HEIGHT: 58 IN | BODY MASS INDEX: 33.44 KG/M2 | DIASTOLIC BLOOD PRESSURE: 75 MMHG

## 2025-05-13 DIAGNOSIS — R73.9 HYPERGLYCEMIA: ICD-10-CM

## 2025-05-13 DIAGNOSIS — N39.3 STRESS INCONTINENCE, FEMALE: ICD-10-CM

## 2025-05-13 DIAGNOSIS — D17.21 LIPOMA OF RIGHT UPPER EXTREMITY: ICD-10-CM

## 2025-05-13 DIAGNOSIS — E78.2 MIXED HYPERLIPIDEMIA: ICD-10-CM

## 2025-05-13 DIAGNOSIS — M25.50 ARTHRALGIA, UNSPECIFIED JOINT: Primary | ICD-10-CM

## 2025-05-13 DIAGNOSIS — K76.0 STEATOSIS OF LIVER: ICD-10-CM

## 2025-05-13 DIAGNOSIS — F33.9 DEPRESSION, RECURRENT: ICD-10-CM

## 2025-05-13 PROCEDURE — 1159F MED LIST DOCD IN RCRD: CPT | Performed by: INTERNAL MEDICINE

## 2025-05-13 PROCEDURE — 1123F ACP DISCUSS/DSCN MKR DOCD: CPT | Performed by: INTERNAL MEDICINE

## 2025-05-13 PROCEDURE — 1160F RVW MEDS BY RX/DR IN RCRD: CPT | Performed by: INTERNAL MEDICINE

## 2025-05-13 PROCEDURE — G2211 COMPLEX E/M VISIT ADD ON: HCPCS | Performed by: INTERNAL MEDICINE

## 2025-05-13 PROCEDURE — 99214 OFFICE O/P EST MOD 30 MIN: CPT | Performed by: INTERNAL MEDICINE

## 2025-05-13 PROCEDURE — 1036F TOBACCO NON-USER: CPT | Performed by: INTERNAL MEDICINE

## 2025-05-13 PROCEDURE — 1158F ADVNC CARE PLAN TLK DOCD: CPT | Performed by: INTERNAL MEDICINE

## 2025-05-13 ASSESSMENT — LIFESTYLE VARIABLES
AUDIT TOTAL SCORE: 0
HAS A RELATIVE, FRIEND, DOCTOR, OR ANOTHER HEALTH PROFESSIONAL EXPRESSED CONCERN ABOUT YOUR DRINKING OR SUGGESTED YOU CUT DOWN: NO
HAVE YOU OR SOMEONE ELSE BEEN INJURED AS A RESULT OF YOUR DRINKING: NO
SKIP TO QUESTIONS 9-10: 1
HOW MANY STANDARD DRINKS CONTAINING ALCOHOL DO YOU HAVE ON A TYPICAL DAY: PATIENT DOES NOT DRINK
HOW OFTEN DO YOU HAVE SIX OR MORE DRINKS ON ONE OCCASION: NEVER
HOW OFTEN DO YOU HAVE A DRINK CONTAINING ALCOHOL: NEVER
AUDIT-C TOTAL SCORE: 0

## 2025-05-13 ASSESSMENT — PATIENT HEALTH QUESTIONNAIRE - PHQ9
2. FEELING DOWN, DEPRESSED OR HOPELESS: NOT AT ALL
1. LITTLE INTEREST OR PLEASURE IN DOING THINGS: NOT AT ALL
SUM OF ALL RESPONSES TO PHQ9 QUESTIONS 1 AND 2: 0

## 2025-05-13 NOTE — PROGRESS NOTES
Assessment & Plan  Joint pain and stiffness  Intermittent joint pain and stiffness affecting multiple joints. Differential includes inflammatory arthritis, rheumatoid arthritis, and psoriatic arthritis. Possible osteoarthritis due to wear and tear. Symptoms improved, no chronic arthritis indicated.  - Order blood tests: sedimentation rate, CRP, ADITYA, rheumatoid factor.  - Advise stretching exercises and yoga.  - Recommend acetaminophen for pain.  Will hold off any other medications since her symptoms are limited and almost resolved at this time.  - Schedule follow-up in three months.    Stress incontinence  Stress incontinence with leakage during sneezing or coughing. She prefers urogynecologist evaluation.  - Provide referral to urogynecologist.    History of vaginal candidiasis but mostly has nonspecific symptoms  As per patient recurrent yeast infection. Previous treatment provided, advised gynecological evaluation.  - Recommend gynecologist evaluation.  - Order urine test to rule out UTI.    History of depression mainly after she lost her mother.  She has improved.  Depression managed with lifestyle modifications, improved mood with a puppy. No medication needed.  - Continue lifestyle modifications.    Patient's hyperlipidemia is stable and she will continue the lifestyle modifications.  She does not want any medication and she does not need it at this time.  Her hyperlipidemia and steatosis of the liver was discussed and she will continue the lifestyle modification and the medication for hyperlipidemia.    She will get the thyroid medication and the alendronate from the endocrinologist.  Clinically she is euthyroid on this exam.    The subcutaneous nodules in the right forearm was discussed in detail.  She has lipoma as per the clinical exam.  I discussed with the patient that she should have biopsy of lesions removed which she wants it to be deferred.       Assessment/Plan     Problem List Items Addressed  This Visit       Hyperglycemia    Hyperlipidemia    Steatosis of liver    Depression, recurrent (CMS-HCC)    Stress incontinence, female    Relevant Orders    Referral to Urogynecology    Urinalysis with Reflex Culture and Microscopic    Arthralgia - Primary    Relevant Orders    Sedimentation Rate    C-Reactive Protein    Uric Acid    CBC and Auto Differential    Comprehensive Metabolic Panel    ADITYA with Reflex to MIRIAN    Rheumatoid factor    Lipoma of right upper extremity       Subjective 0    Patient ID: Cristy Morgan is a 69 y.o. female who presents for Follow-up.    History of Present Illness  Cristy Morgan is a 69 year old female who presents with sudden onset of joint pain and stiffness.    Approximately two weeks ago, she experienced sudden onset of pain and stiffness in every joint, including her toes, knees, hips, back, neck, elbows, and arms. The symptoms have been intermittent, with some days being symptom-free, but she continues to experience stiffness, particularly in her feet, knees, and back. No previous similar episodes, trauma, or swelling of the joints. No history of falls or unusual physical activity preceding the onset of symptoms. She has not been taking any medication for the joint pain, including over-the-counter options like Tylenol, and prefers to avoid medication.    She experiences stress incontinence, particularly when sneezing or coughing, and notes that her mother had similar issues. She has not yet seen a gynecologist or urogynecologist for this issue.    She reports intermittent headaches that sometimes wake her at night with sharp pain, but they do not significantly interfere with her daily activities.    She is on a diet and has noticed a reduction in inches but not in weight. She has not been able to obtain a GLP-1 medication due to insurance issues. She is currently taking thyroid medication and Fosamax, prescribed by her endocrinologist, but needs to refill her Fosamax  prescription.    She has a history of depression, which has improved with the addition of a puppy to her household. She is not currently taking any medication for mood.    She mentions a possible yeast infection or UTI and has experienced similar issues in the past.  But on detailed questioning patient says there is because of her incontinent which occurs with the coughing or other straining.       Surgical History[1]     ROS    Family History[2]   Social History     Socioeconomic History    Marital status:      Spouse name: Not on file    Number of children: Not on file    Years of education: Not on file    Highest education level: Not on file   Occupational History    Not on file   Tobacco Use    Smoking status: Never     Passive exposure: Never    Smokeless tobacco: Never   Vaping Use    Vaping status: Never Used   Substance and Sexual Activity    Alcohol use: Never    Drug use: Never    Sexual activity: Not on file   Other Topics Concern    Not on file   Social History Narrative    Not on file     Social Drivers of Health     Financial Resource Strain: Not on file   Food Insecurity: Not on file   Transportation Needs: Not on file   Physical Activity: Not on file   Stress: Not on file   Social Connections: Not on file   Intimate Partner Violence: Not on file   Housing Stability: Not on file      Bee venom protein (honey bee), Feathers, Gluten, Nut - unspecified, Nutritional supplement-fiber, Other, Pepper (genus capsicum), Potato, Pseudoephedrine hcl, Wheat, Whey, Barium iodide, Barium sulfate, Eggplant, Fish containing products, Fish derived, Gadolinium-containing contrast media, Grass pollen, House dust mite, Iodinated contrast media, Milk containing products (dairy), Mold, Oxycodone-acetaminophen, Paprika, Perfume, Phenylephrine, Prednisone, Pregnenolone, Tomato, Tree and shrub pollen, Tree nuts, Peanut, and Tree nut   Current Rx[3]         Objective     Vitals:    05/13/25 0811   BP: 103/75   Pulse: 86         Physical Exam    Normal-built, well-nourished  with no apparent distress. Alert oriented  Skin:  Normal turgor.  No rash.  Head:  Normocephalic, atraumatic.  Eyes:  Pupils are equal, round,.  No pallor of conjunctivae.  Mouth has moist oral mucosa.  Neck:  Supple.  No JVD.  No carotid bruit.  No thyromegaly. No cervical lymphadenopathy.  No clubbing, no joint swellings, no joint stiffness on my exam.  Knee joint there is no swelling or tenderness.  Patient's right forearm below the elbow on the outer aspect has at least 3 small nodules in the subcutaneous plane mobile nontender without any punctum measured ranging from 2 mm to 3 mm in size on clinical exam seems to be lipoma.  Chest:  Vesicular breathing Bilaterally good air entry and bilaterally clear to auscultation.  No wheezing.  No crackles.  Heart:  Regular rate and rhythm.  S1, S2 positive.  No murmur.  Abdomen:  Soft and nontender.  Bowel sounds are positive.  No organomegaly.  Extremities: No significant swelling but the detailed exam is deferred.  No calf tenderness. Homans sign is negative.  Neuro Exam: No focal signs. Gait is normal.      Problem List Items Addressed This Visit       Hyperglycemia    Hyperlipidemia    Steatosis of liver    Depression, recurrent (CMS-HCC)    Stress incontinence, female    Relevant Orders    Referral to Urogynecology    Urinalysis with Reflex Culture and Microscopic    Arthralgia - Primary    Relevant Orders    Sedimentation Rate    C-Reactive Protein    Uric Acid    CBC and Auto Differential    Comprehensive Metabolic Panel    ADITYA with Reflex to MIRIAN    Rheumatoid factor    Lipoma of right upper extremity        Orders Placed This Encounter   Procedures    Sedimentation Rate     Standing Status:   Future     Number of Occurrences:   1     Expected Date:   5/13/2025     Expiration Date:   5/13/2026     Release result to [facility]:   Immediate [1]    C-Reactive Protein     Standing Status:   Future     Number of  Occurrences:   1     Expected Date:   5/13/2025     Expiration Date:   5/13/2026     [facility]   [facility] [1]    Uric Acid     Standing Status:   Future     Number of Occurrences:   1     Expected Date:   5/13/2025     Expiration Date:   5/13/2026     [facility]   [facility] [1]    CBC and Auto Differential     Standing Status:   Future     Number of Occurrences:   1     Expected Date:   5/13/2025     Expiration Date:   5/13/2026     [facility]   Im[facility]    Comprehensive Metabolic Panel     Standing Status:   Future     Number of Occurrences:   1     Expected Date:   5/13/2025     Expiration Date:   5/13/2026     [facility]:   Immediate    ADITYA with Reflex to MIRIAN     Standing Status:   Future     Number of Occurrences:   1     Expected Date:   5/13/2025     Expiration Date:   5/13/2026     [facility]   [facility] [1]    Rheumatoid factor     Standing Status:   Future     Number of Occurrences:   1     Expected Date:   5/13/2025     Expiration Date:   5/13/2026     [facility]   Im[facility] [1]    Urinalysis with Reflex Culture and Microscopic     Please do the culture even if the urinalysis done in the office.     Standing Status:   Future     Number of Occurrences:   1     Expected Date:   5/13/2025     Expiration Date:   5/13/2026     Release result to [facility]:   Immediate [1]    Referral to Urogynecology     Standing Status:   Future     Expected Date:   5/13/2025     Expiration Date:   5/13/2026     Referral Priority:   Routine     Referral Type:   Consultation     Referral Reason:   Specialty Services Required     Requested Specialty:   Urogynecology     Number of Visits Requested:   1        @LASTLAB[<insert lab base name>:<insert number of results or*for all>,<repeat format for multiple labs>]@  Lab Results   Component Value Date    CHOL 171 02/17/2025    LDLCALC 93 02/17/2025    CHHDL 3.6 02/17/2025       Imaging  No  results found.    Cardiology, Vascular, and Other Imaging  No other imaging results found for the past 7 days            This medical note was created with the assistance of artificial intelligence (AI) for documentation purposes. The content has been reviewed and confirmed by the healthcare provider for accuracy and completeness. Patient consented to the use of audio recording and use of AI during their visit. Patient was identified as a fall risk. Risk prevention instructions provided.       [1]   Past Surgical History:  Procedure Laterality Date    APPENDECTOMY  12/10/2012    Appendectomy    CHOLECYSTECTOMY  2013    Cholecystectomy    COLONOSCOPY  12/10/2012    Complete Colonoscopy    OTHER SURGICAL HISTORY  12/10/2012    Liposuction Arms    OTHER SURGICAL HISTORY  2019    Shoulder arthroscopy    OTHER SURGICAL HISTORY  2020    Tubal ligation    OTHER SURGICAL HISTORY  2020    Endometrial ablation    OTHER SURGICAL HISTORY  2013    Shoulder Surgery Left    OTHER SURGICAL HISTORY  2022     section    OTHER SURGICAL HISTORY  2022    Dilation and evacuation    OTHER SURGICAL HISTORY  2021    Elbow surgery   [2]   Family History  Problem Relation Name Age of Onset    Other (Thyroid Disorder) Mother      Alzheimer's disease Mother      Dementia Mother      Macular degeneration Mother      Seizures Mother      Solid Tumor Mother      Cancer Father      Heart disease Father      Hearing loss Sister      Nephrolithiasis Brother      ADD / ADHD Son      Diabetes Maternal Grandfather      Cancer Paternal Grandfather     [3]   Current Outpatient Medications   Medication Sig Dispense Refill    alendronate (Fosamax) 70 mg tablet Take 1 tablet (70 mg) by mouth every 7 days. 1 tablet weekly on an empty stomach, remain upright for at least 30 minutes 13 tablet 3    B6-folic-B12-coffee-phosphatid (Neuriva Plus Brain Performance) 1.7 mg-400 mcg- 2.4 mcg capsule Take by mouth.  1 capsule daily      calcium amino acid chelate 200 mg calcium tablet Take 1 tablet (200 mg) by mouth once daily.      cholecalciferol (Vitamin D-3) 50 MCG (2000 UT) tablet Take 1 tablet (50 mcg) by mouth once daily.      ginkgo biloba leaf extract 60 mg capsule Take by mouth. 1 capsule daily      levothyroxine (Synthroid, Levoxyl) 75 mcg tablet Take 1 tablet (75 mcg) by mouth once daily in the morning. Take before meals. 90 tablet 3    multivit-min/ferrous fumarate (MULTI VITAMIN ORAL) Take 1 tablet by mouth once daily.      omega 3-dha-epa-fish oil 1,000 mg (120 mg-180 mg) capsule 1 cap(s) orally once a day      simethicone (Mylicon) 125 mg chewable tablet Chew every 6 hours if needed for flatulence.      simvastatin (Zocor) 10 mg tablet TAKE 1 TABLET DAILY AT BEDTIME 90 tablet 1    TIRZEPATIDE, WEIGHT LOSS, SUBQ Inject under the skin. 3mg weekly      ZINC ORAL Take by mouth.      diphenhydrAMINE (Benadryl Allergy) 25 mg tablet 1 tab(s) orally once a day (at bedtime), As Needed (Patient not taking: Reported on 5/13/2025)      hydrocortisone 2.5 % ointment Apply topically 2 times a day. Up to 14 days as needed for rash/itching (Patient not taking: Reported on 5/13/2025) 30 g 3     No current facility-administered medications for this visit.

## 2025-05-14 DIAGNOSIS — M81.0 OSTEOPOROSIS WITHOUT CURRENT PATHOLOGICAL FRACTURE, UNSPECIFIED OSTEOPOROSIS TYPE: ICD-10-CM

## 2025-05-14 DIAGNOSIS — E03.9 HYPOTHYROIDISM, UNSPECIFIED TYPE: Primary | ICD-10-CM

## 2025-05-14 LAB
ALBUMIN SERPL-MCNC: 4.4 G/DL (ref 3.6–5.1)
ALP SERPL-CCNC: 53 U/L (ref 37–153)
ALT SERPL-CCNC: 18 U/L (ref 6–29)
ANA SER QL IF: NEGATIVE
ANION GAP SERPL CALCULATED.4IONS-SCNC: 8 MMOL/L (CALC) (ref 7–17)
APPEARANCE UR: ABNORMAL
AST SERPL-CCNC: 19 U/L (ref 10–35)
BACTERIA #/AREA URNS HPF: ABNORMAL /HPF
BACTERIA UR CULT: ABNORMAL
BASOPHILS # BLD AUTO: 28 CELLS/UL (ref 0–200)
BASOPHILS NFR BLD AUTO: 0.6 %
BILIRUB SERPL-MCNC: 0.7 MG/DL (ref 0.2–1.2)
BILIRUB UR QL STRIP: NEGATIVE
BUN SERPL-MCNC: 14 MG/DL (ref 7–25)
CALCIUM SERPL-MCNC: 9.1 MG/DL (ref 8.6–10.4)
CAOX CRY #/AREA URNS HPF: ABNORMAL /HPF
CHLORIDE SERPL-SCNC: 108 MMOL/L (ref 98–110)
CO2 SERPL-SCNC: 26 MMOL/L (ref 20–32)
COLOR UR: YELLOW
CREAT SERPL-MCNC: 0.87 MG/DL (ref 0.5–1.05)
CRP SERPL-MCNC: <3 MG/L
EGFRCR SERPLBLD CKD-EPI 2021: 73 ML/MIN/1.73M2
EOSINOPHIL # BLD AUTO: 99 CELLS/UL (ref 15–500)
EOSINOPHIL NFR BLD AUTO: 2.1 %
ERYTHROCYTE [DISTWIDTH] IN BLOOD BY AUTOMATED COUNT: 12.6 % (ref 11–15)
ERYTHROCYTE [SEDIMENTATION RATE] IN BLOOD BY WESTERGREN METHOD: 2 MM/H
GLUCOSE SERPL-MCNC: 141 MG/DL (ref 65–99)
GLUCOSE UR QL STRIP: NEGATIVE
HCT VFR BLD AUTO: 47.5 % (ref 35–45)
HGB BLD-MCNC: 15.4 G/DL (ref 11.7–15.5)
HGB UR QL STRIP: NEGATIVE
HYALINE CASTS #/AREA URNS LPF: ABNORMAL /LPF
KETONES UR QL STRIP: NEGATIVE
LEUKOCYTE ESTERASE UR QL STRIP: ABNORMAL
LYMPHOCYTES # BLD AUTO: 2054 CELLS/UL (ref 850–3900)
LYMPHOCYTES NFR BLD AUTO: 43.7 %
MCH RBC QN AUTO: 30.9 PG (ref 27–33)
MCHC RBC AUTO-ENTMCNC: 32.4 G/DL (ref 32–36)
MCV RBC AUTO: 95.2 FL (ref 80–100)
MONOCYTES # BLD AUTO: 371 CELLS/UL (ref 200–950)
MONOCYTES NFR BLD AUTO: 7.9 %
NEUTROPHILS # BLD AUTO: 2148 CELLS/UL (ref 1500–7800)
NEUTROPHILS NFR BLD AUTO: 45.7 %
NITRITE UR QL STRIP: NEGATIVE
PH UR STRIP: 5.5 [PH] (ref 5–8)
PLATELET # BLD AUTO: 127 THOUSAND/UL (ref 140–400)
PMV BLD REES-ECKER: 10.6 FL (ref 7.5–12.5)
POTASSIUM SERPL-SCNC: 4 MMOL/L (ref 3.5–5.3)
PROT SERPL-MCNC: 6.9 G/DL (ref 6.1–8.1)
PROT UR QL STRIP: NEGATIVE
RBC # BLD AUTO: 4.99 MILLION/UL (ref 3.8–5.1)
RBC #/AREA URNS HPF: ABNORMAL /HPF
RHEUMATOID FACT SERPL-ACNC: <10 IU/ML
SERVICE CMNT-IMP: ABNORMAL
SERVICE CMNT-IMP: ABNORMAL
SODIUM SERPL-SCNC: 142 MMOL/L (ref 135–146)
SP GR UR STRIP: 1.02 (ref 1–1.03)
SQUAMOUS #/AREA URNS HPF: ABNORMAL /HPF
URATE CRY #/AREA URNS HPF: ABNORMAL /HPF
URATE SERPL-MCNC: 7.9 MG/DL (ref 2.5–7)
WBC # BLD AUTO: 4.7 THOUSAND/UL (ref 3.8–10.8)
WBC #/AREA URNS HPF: ABNORMAL /HPF

## 2025-05-14 RX ORDER — ALENDRONATE SODIUM 70 MG/1
TABLET ORAL
Qty: 12 TABLET | Refills: 3 | Status: SHIPPED | OUTPATIENT
Start: 2025-05-14

## 2025-05-14 RX ORDER — ALENDRONATE SODIUM 70 MG/1
70 TABLET ORAL
Qty: 13 TABLET | Refills: 3 | Status: SHIPPED | OUTPATIENT
Start: 2025-05-14 | End: 2026-05-14

## 2025-05-14 NOTE — RESULT ENCOUNTER NOTE
Labs shows slightly elevated uric acid levels, borderline low platelets.  Her urine shows probably contaminated from the skin citlalli.  Culture is not available.  Rest of the results are acceptable.  We will review the results in detail during office follow-up and please advise patient to keep the follow-up appointment as scheduled.  Patient should follow-up with the urogynecologist early as possible or at least her regular gynecologist because of her symptoms she need to have a complete exam.

## 2025-05-14 NOTE — TELEPHONE ENCOUNTER
Pt states her Endocrinologist is no longer with [facility] and she needs a refill on Aldrenonate to local pharmacy and a referral to Endocrinology

## 2025-05-19 ENCOUNTER — TELEPHONE (OUTPATIENT)
Dept: PRIMARY CARE | Facility: CLINIC | Age: 70
End: 2025-05-19
Payer: MEDICARE

## 2025-05-19 NOTE — TELEPHONE ENCOUNTER
----- Message from Andrew Allen sent at 5/14/2025  5:13 PM EDT -----  Labs shows slightly elevated uric acid levels, borderline low platelets.  Her urine shows probably contaminated from the skin citlalli.  Culture is not available.  Rest of the results are acceptable.    We will review the results in detail during office follow-up and please advise patient to keep the follow-up appointment as scheduled.  Patient should follow-up with the urogynecologist early as   possible or at least her regular gynecologist because of her symptoms she need to have a complete exam.  ----- Message -----  From: [facility] Results In  Sent: 5/14/2025   5:27 AM EDT  To: Andrew Allen MD

## 2025-08-18 ENCOUNTER — APPOINTMENT (OUTPATIENT)
Dept: PRIMARY CARE | Facility: CLINIC | Age: 70
End: 2025-08-18
Payer: MEDICARE

## 2025-09-03 ENCOUNTER — APPOINTMENT (OUTPATIENT)
Dept: PRIMARY CARE | Facility: CLINIC | Age: 70
End: 2025-09-03
Payer: MEDICARE

## 2025-09-03 ENCOUNTER — HOSPITAL ENCOUNTER (OUTPATIENT)
Dept: RADIOLOGY | Facility: CLINIC | Age: 70
Discharge: HOME | End: 2025-09-03
Payer: MEDICARE

## 2025-09-03 DIAGNOSIS — M25.512 ACUTE PAIN OF LEFT SHOULDER: ICD-10-CM

## 2025-09-03 PROCEDURE — 73030 X-RAY EXAM OF SHOULDER: CPT | Mod: LT

## 2025-09-03 ASSESSMENT — LIFESTYLE VARIABLES
HAVE YOU OR SOMEONE ELSE BEEN INJURED AS A RESULT OF YOUR DRINKING: NO
SKIP TO QUESTIONS 9-10: 1
HOW MANY STANDARD DRINKS CONTAINING ALCOHOL DO YOU HAVE ON A TYPICAL DAY: PATIENT DOES NOT DRINK
HAS A RELATIVE, FRIEND, DOCTOR, OR ANOTHER HEALTH PROFESSIONAL EXPRESSED CONCERN ABOUT YOUR DRINKING OR SUGGESTED YOU CUT DOWN: NO
AUDIT-C TOTAL SCORE: 0
HOW OFTEN DO YOU HAVE A DRINK CONTAINING ALCOHOL: NEVER
HOW OFTEN DO YOU HAVE SIX OR MORE DRINKS ON ONE OCCASION: NEVER
AUDIT TOTAL SCORE: 0

## 2025-09-03 ASSESSMENT — ACTIVITIES OF DAILY LIVING (ADL)
MANAGING_FINANCES: INDEPENDENT
DOING_HOUSEWORK: INDEPENDENT
TAKING_MEDICATION: INDEPENDENT
BATHING: INDEPENDENT
DRESSING: INDEPENDENT
GROCERY_SHOPPING: INDEPENDENT

## 2025-09-04 ENCOUNTER — RESULTS FOLLOW-UP (OUTPATIENT)
Dept: PRIMARY CARE | Facility: CLINIC | Age: 70
End: 2025-09-04
Payer: MEDICARE

## 2025-09-04 LAB
25(OH)D3+25(OH)D2 SERPL-MCNC: 43 NG/ML (ref 30–100)
ALBUMIN SERPL-MCNC: 4.6 G/DL (ref 3.6–5.1)
ALBUMIN SERPL-MCNC: 4.6 G/DL (ref 3.6–5.1)
ALBUMIN/GLOB SERPL: 1.6 (CALC) (ref 1–2.5)
ALBUMIN/GLOB SERPL: 1.6 (CALC) (ref 1–2.5)
ALP SERPL-CCNC: 39 U/L (ref 37–153)
ALP SERPL-CCNC: 39 U/L (ref 37–153)
ALT SERPL-CCNC: 19 U/L (ref 6–29)
ALT SERPL-CCNC: 20 U/L (ref 6–29)
AST SERPL-CCNC: 19 U/L (ref 10–35)
AST SERPL-CCNC: 19 U/L (ref 10–35)
BILIRUB SERPL-MCNC: 1.1 MG/DL (ref 0.2–1.2)
BILIRUB SERPL-MCNC: 1.1 MG/DL (ref 0.2–1.2)
BUN SERPL-MCNC: 16 MG/DL (ref 7–25)
BUN SERPL-MCNC: 16 MG/DL (ref 7–25)
BUN/CREAT SERPL: ABNORMAL (CALC) (ref 6–22)
BUN/CREAT SERPL: ABNORMAL (CALC) (ref 6–22)
CALCIUM SERPL-MCNC: 9.2 MG/DL (ref 8.6–10.4)
CALCIUM SERPL-MCNC: 9.3 MG/DL (ref 8.6–10.4)
CHLORIDE SERPL-SCNC: 104 MMOL/L (ref 98–110)
CHLORIDE SERPL-SCNC: 104 MMOL/L (ref 98–110)
CHOLEST SERPL-MCNC: 173 MG/DL
CHOLEST/HDLC SERPL: 3.6 (CALC)
CO2 SERPL-SCNC: 26 MMOL/L (ref 20–32)
CO2 SERPL-SCNC: 27 MMOL/L (ref 20–32)
CREAT SERPL-MCNC: 0.76 MG/DL (ref 0.5–1.05)
CREAT SERPL-MCNC: 0.76 MG/DL (ref 0.5–1.05)
EGFRCR SERPLBLD CKD-EPI 2021: 86 ML/MIN/1.73M2
EGFRCR SERPLBLD CKD-EPI 2021: 86 ML/MIN/1.73M2
GLOBULIN SER CALC-MCNC: 2.8 G/DL (CALC) (ref 1.9–3.7)
GLOBULIN SER CALC-MCNC: 2.8 G/DL (CALC) (ref 1.9–3.7)
GLUCOSE SERPL-MCNC: 113 MG/DL (ref 65–99)
GLUCOSE SERPL-MCNC: 115 MG/DL (ref 65–99)
HBA1C MFR BLD: 5.8 %
HDLC SERPL-MCNC: 48 MG/DL
LDLC SERPL CALC-MCNC: 94 MG/DL (CALC)
NONHDLC SERPL-MCNC: 125 MG/DL (CALC)
POTASSIUM SERPL-SCNC: 4 MMOL/L (ref 3.5–5.3)
POTASSIUM SERPL-SCNC: 4 MMOL/L (ref 3.5–5.3)
PROT SERPL-MCNC: 7.4 G/DL (ref 6.1–8.1)
PROT SERPL-MCNC: 7.4 G/DL (ref 6.1–8.1)
SODIUM SERPL-SCNC: 140 MMOL/L (ref 135–146)
SODIUM SERPL-SCNC: 140 MMOL/L (ref 135–146)
T4 SERPL-MCNC: 10.1 MCG/DL (ref 5.1–11.9)
THYROPEROXIDASE AB SERPL-ACNC: 1 IU/ML
TRIGL SERPL-MCNC: 223 MG/DL
TSH SERPL-ACNC: 0.62 MIU/L (ref 0.4–4.5)
TSH SERPL-ACNC: 0.64 MIU/L (ref 0.4–4.5)

## 2025-11-17 ENCOUNTER — APPOINTMENT (OUTPATIENT)
Dept: PRIMARY CARE | Facility: CLINIC | Age: 70
End: 2025-11-17
Payer: MEDICARE